# Patient Record
Sex: FEMALE | Race: WHITE | NOT HISPANIC OR LATINO | Employment: FULL TIME | URBAN - NONMETROPOLITAN AREA
[De-identification: names, ages, dates, MRNs, and addresses within clinical notes are randomized per-mention and may not be internally consistent; named-entity substitution may affect disease eponyms.]

---

## 2018-01-25 ENCOUNTER — OUTSIDE FACILITY SERVICE (OUTPATIENT)
Dept: CARDIOLOGY | Facility: CLINIC | Age: 43
End: 2018-01-25

## 2018-01-25 PROCEDURE — 93018 CV STRESS TEST I&R ONLY: CPT | Performed by: INTERNAL MEDICINE

## 2018-01-25 PROCEDURE — 78452 HT MUSCLE IMAGE SPECT MULT: CPT | Performed by: INTERNAL MEDICINE

## 2022-06-06 ENCOUNTER — OUTSIDE FACILITY SERVICE (OUTPATIENT)
Dept: CARDIOLOGY | Facility: CLINIC | Age: 47
End: 2022-06-06

## 2022-06-06 PROCEDURE — 93018 CV STRESS TEST I&R ONLY: CPT | Performed by: INTERNAL MEDICINE

## 2022-06-06 PROCEDURE — 78452 HT MUSCLE IMAGE SPECT MULT: CPT | Performed by: INTERNAL MEDICINE

## 2024-05-26 ENCOUNTER — HOSPITAL ENCOUNTER (OUTPATIENT)
Facility: HOSPITAL | Age: 49
Discharge: HOME | End: 2024-05-28
Attending: EMERGENCY MEDICINE | Admitting: INTERNAL MEDICINE
Payer: COMMERCIAL

## 2024-05-26 ENCOUNTER — APPOINTMENT (OUTPATIENT)
Dept: CARDIOLOGY | Facility: HOSPITAL | Age: 49
End: 2024-05-26
Payer: COMMERCIAL

## 2024-05-26 ENCOUNTER — APPOINTMENT (OUTPATIENT)
Dept: CT IMAGING | Facility: HOSPITAL | Age: 49
End: 2024-05-26
Payer: COMMERCIAL

## 2024-05-26 ENCOUNTER — APPOINTMENT (OUTPATIENT)
Dept: GENERAL RADIOLOGY | Facility: HOSPITAL | Age: 49
End: 2024-05-26
Payer: COMMERCIAL

## 2024-05-26 DIAGNOSIS — I20.0 UNSTABLE ANGINA: Primary | ICD-10-CM

## 2024-05-26 DIAGNOSIS — E87.6 HYPOKALEMIA: ICD-10-CM

## 2024-05-26 DIAGNOSIS — R07.2 PRECORDIAL PAIN: ICD-10-CM

## 2024-05-26 DIAGNOSIS — Z95.5 HISTORY OF PLACEMENT OF STENT IN LAD CORONARY ARTERY: ICD-10-CM

## 2024-05-26 PROBLEM — R07.9 CHEST PAIN: Status: ACTIVE | Noted: 2024-05-26

## 2024-05-26 LAB
ALBUMIN SERPL-MCNC: 4.7 G/DL (ref 3.5–5.2)
ALBUMIN/GLOB SERPL: 1.4 G/DL
ALP SERPL-CCNC: 101 U/L (ref 39–117)
ALT SERPL W P-5'-P-CCNC: 13 U/L (ref 1–33)
ANION GAP SERPL CALCULATED.3IONS-SCNC: 9.9 MMOL/L (ref 5–15)
AST SERPL-CCNC: 23 U/L (ref 1–32)
BASOPHILS # BLD AUTO: 0.05 10*3/MM3 (ref 0–0.2)
BASOPHILS NFR BLD AUTO: 0.7 % (ref 0–1.5)
BH CV ECHO MEAS - AO MAX PG: 6.7 MMHG
BH CV ECHO MEAS - AO MEAN PG: 3 MMHG
BH CV ECHO MEAS - AO ROOT DIAM: 2.9 CM
BH CV ECHO MEAS - AO V2 MAX: 129 CM/SEC
BH CV ECHO MEAS - AO V2 VTI: 27.6 CM
BH CV ECHO MEAS - AVA(I,D): 2.07 CM2
BH CV ECHO MEAS - EDV(CUBED): 101.2 ML
BH CV ECHO MEAS - EDV(MOD-SP2): 56.7 ML
BH CV ECHO MEAS - EDV(MOD-SP4): 66.4 ML
BH CV ECHO MEAS - EF(MOD-BP): 60.3 %
BH CV ECHO MEAS - EF(MOD-SP2): 60.8 %
BH CV ECHO MEAS - EF(MOD-SP4): 60.4 %
BH CV ECHO MEAS - ESV(CUBED): 24.6 ML
BH CV ECHO MEAS - ESV(MOD-SP2): 22.2 ML
BH CV ECHO MEAS - ESV(MOD-SP4): 26.3 ML
BH CV ECHO MEAS - FS: 37.6 %
BH CV ECHO MEAS - IVS/LVPW: 1.07 CM
BH CV ECHO MEAS - IVSD: 0.96 CM
BH CV ECHO MEAS - LA DIMENSION: 3.6 CM
BH CV ECHO MEAS - LAT PEAK E' VEL: 10.2 CM/SEC
BH CV ECHO MEAS - LV DIASTOLIC VOL/BSA (35-75): 38.8 CM2
BH CV ECHO MEAS - LV MASS(C)D: 147 GRAMS
BH CV ECHO MEAS - LV MAX PG: 3.8 MMHG
BH CV ECHO MEAS - LV MEAN PG: 2 MMHG
BH CV ECHO MEAS - LV SYSTOLIC VOL/BSA (12-30): 15.4 CM2
BH CV ECHO MEAS - LV V1 MAX: 97.3 CM/SEC
BH CV ECHO MEAS - LV V1 VTI: 22.5 CM
BH CV ECHO MEAS - LVIDD: 4.7 CM
BH CV ECHO MEAS - LVIDS: 2.9 CM
BH CV ECHO MEAS - LVOT AREA: 2.5 CM2
BH CV ECHO MEAS - LVOT DIAM: 1.8 CM
BH CV ECHO MEAS - LVPWD: 0.9 CM
BH CV ECHO MEAS - MED PEAK E' VEL: 10.3 CM/SEC
BH CV ECHO MEAS - MV A MAX VEL: 58.3 CM/SEC
BH CV ECHO MEAS - MV DEC SLOPE: 280 CM/SEC2
BH CV ECHO MEAS - MV DEC TIME: 0.28 SEC
BH CV ECHO MEAS - MV E MAX VEL: 79.3 CM/SEC
BH CV ECHO MEAS - MV E/A: 1.36
BH CV ECHO MEAS - PA ACC TIME: 0.18 SEC
BH CV ECHO MEAS - SV(LVOT): 57.3 ML
BH CV ECHO MEAS - SV(MOD-SP2): 34.5 ML
BH CV ECHO MEAS - SV(MOD-SP4): 40.1 ML
BH CV ECHO MEAS - SVI(LVOT): 33.5 ML/M2
BH CV ECHO MEAS - SVI(MOD-SP2): 20.2 ML/M2
BH CV ECHO MEAS - SVI(MOD-SP4): 23.4 ML/M2
BH CV ECHO MEAS - TAPSE (>1.6): 2.6 CM
BH CV ECHO MEAS - TR MAX PG: 25.6 MMHG
BH CV ECHO MEAS - TR MAX VEL: 253 CM/SEC
BH CV ECHO MEASUREMENTS AVERAGE E/E' RATIO: 7.74
BH CV XLRA - RV BASE: 3.2 CM
BH CV XLRA - RV LENGTH: 5.6 CM
BH CV XLRA - RV MID: 2.6 CM
BILIRUB SERPL-MCNC: 0.4 MG/DL (ref 0–1.2)
BUN SERPL-MCNC: 11 MG/DL (ref 6–20)
BUN/CREAT SERPL: 14.3 (ref 7–25)
CALCIUM SPEC-SCNC: 9.9 MG/DL (ref 8.6–10.5)
CHLORIDE SERPL-SCNC: 106 MMOL/L (ref 98–107)
CO2 SERPL-SCNC: 25.1 MMOL/L (ref 22–29)
CREAT SERPL-MCNC: 0.77 MG/DL (ref 0.57–1)
D DIMER PPP FEU-MCNC: <0.27 MCGFEU/ML (ref 0–0.5)
DEPRECATED RDW RBC AUTO: 37.7 FL (ref 37–54)
EGFRCR SERPLBLD CKD-EPI 2021: 95.9 ML/MIN/1.73
EOSINOPHIL # BLD AUTO: 0.17 10*3/MM3 (ref 0–0.4)
EOSINOPHIL NFR BLD AUTO: 2.5 % (ref 0.3–6.2)
ERYTHROCYTE [DISTWIDTH] IN BLOOD BY AUTOMATED COUNT: 12.1 % (ref 12.3–15.4)
GEN 5 2HR TROPONIN T REFLEX: 6 NG/L
GLOBULIN UR ELPH-MCNC: 3.4 GM/DL
GLUCOSE SERPL-MCNC: 121 MG/DL (ref 65–99)
HCT VFR BLD AUTO: 43.1 % (ref 34–46.6)
HGB BLD-MCNC: 14.6 G/DL (ref 12–15.9)
HOLD SPECIMEN: NORMAL
HOLD SPECIMEN: NORMAL
IMM GRANULOCYTES # BLD AUTO: 0.03 10*3/MM3 (ref 0–0.05)
IMM GRANULOCYTES NFR BLD AUTO: 0.4 % (ref 0–0.5)
LEFT ATRIUM VOLUME INDEX: 20.9 ML/M2
LYMPHOCYTES # BLD AUTO: 2.73 10*3/MM3 (ref 0.7–3.1)
LYMPHOCYTES NFR BLD AUTO: 39.6 % (ref 19.6–45.3)
MCH RBC QN AUTO: 29.1 PG (ref 26.6–33)
MCHC RBC AUTO-ENTMCNC: 33.9 G/DL (ref 31.5–35.7)
MCV RBC AUTO: 86 FL (ref 79–97)
MONOCYTES # BLD AUTO: 0.56 10*3/MM3 (ref 0.1–0.9)
MONOCYTES NFR BLD AUTO: 8.1 % (ref 5–12)
NEUTROPHILS NFR BLD AUTO: 3.35 10*3/MM3 (ref 1.7–7)
NEUTROPHILS NFR BLD AUTO: 48.7 % (ref 42.7–76)
NRBC BLD AUTO-RTO: 0 /100 WBC (ref 0–0.2)
NT-PROBNP SERPL-MCNC: 162 PG/ML (ref 0–450)
PLATELET # BLD AUTO: 213 10*3/MM3 (ref 140–450)
PMV BLD AUTO: 9.5 FL (ref 6–12)
POTASSIUM SERPL-SCNC: 3.4 MMOL/L (ref 3.5–5.2)
PROT SERPL-MCNC: 8.1 G/DL (ref 6–8.5)
QT INTERVAL: 404 MS
QT INTERVAL: 452 MS
QT INTERVAL: 464 MS
QTC INTERVAL: 451 MS
QTC INTERVAL: 452 MS
QTC INTERVAL: 464 MS
RBC # BLD AUTO: 5.01 10*6/MM3 (ref 3.77–5.28)
SODIUM SERPL-SCNC: 141 MMOL/L (ref 136–145)
TROPONIN T DELTA: -1 NG/L
TROPONIN T SERPL HS-MCNC: 7 NG/L
TROPONIN T SERPL HS-MCNC: <6 NG/L
TROPONIN T SERPL HS-MCNC: <6 NG/L
WBC NRBC COR # BLD AUTO: 6.89 10*3/MM3 (ref 3.4–10.8)
WHOLE BLOOD HOLD COAG: NORMAL
WHOLE BLOOD HOLD SPECIMEN: NORMAL

## 2024-05-26 PROCEDURE — 25010000002 HEPARIN (PORCINE) PER 1000 UNITS: Performed by: INTERNAL MEDICINE

## 2024-05-26 PROCEDURE — G0378 HOSPITAL OBSERVATION PER HR: HCPCS

## 2024-05-26 PROCEDURE — 84484 ASSAY OF TROPONIN QUANT: CPT | Performed by: STUDENT IN AN ORGANIZED HEALTH CARE EDUCATION/TRAINING PROGRAM

## 2024-05-26 PROCEDURE — 96374 THER/PROPH/DIAG INJ IV PUSH: CPT

## 2024-05-26 PROCEDURE — 85379 FIBRIN DEGRADATION QUANT: CPT | Performed by: STUDENT IN AN ORGANIZED HEALTH CARE EDUCATION/TRAINING PROGRAM

## 2024-05-26 PROCEDURE — 96372 THER/PROPH/DIAG INJ SC/IM: CPT

## 2024-05-26 PROCEDURE — 93306 TTE W/DOPPLER COMPLETE: CPT | Performed by: INTERNAL MEDICINE

## 2024-05-26 PROCEDURE — 93010 ELECTROCARDIOGRAM REPORT: CPT | Performed by: INTERNAL MEDICINE

## 2024-05-26 PROCEDURE — 71045 X-RAY EXAM CHEST 1 VIEW: CPT | Performed by: RADIOLOGY

## 2024-05-26 PROCEDURE — 85025 COMPLETE CBC W/AUTO DIFF WBC: CPT | Performed by: EMERGENCY MEDICINE

## 2024-05-26 PROCEDURE — 93005 ELECTROCARDIOGRAM TRACING: CPT

## 2024-05-26 PROCEDURE — 71045 X-RAY EXAM CHEST 1 VIEW: CPT

## 2024-05-26 PROCEDURE — 96375 TX/PRO/DX INJ NEW DRUG ADDON: CPT

## 2024-05-26 PROCEDURE — 84484 ASSAY OF TROPONIN QUANT: CPT

## 2024-05-26 PROCEDURE — 80053 COMPREHEN METABOLIC PANEL: CPT | Performed by: EMERGENCY MEDICINE

## 2024-05-26 PROCEDURE — 75574 CT ANGIO HRT W/3D IMAGE: CPT

## 2024-05-26 PROCEDURE — 93306 TTE W/DOPPLER COMPLETE: CPT

## 2024-05-26 PROCEDURE — 25510000001 IOPAMIDOL PER 1 ML: Performed by: STUDENT IN AN ORGANIZED HEALTH CARE EDUCATION/TRAINING PROGRAM

## 2024-05-26 PROCEDURE — 93005 ELECTROCARDIOGRAM TRACING: CPT | Performed by: STUDENT IN AN ORGANIZED HEALTH CARE EDUCATION/TRAINING PROGRAM

## 2024-05-26 PROCEDURE — 25010000002 ONDANSETRON PER 1 MG: Performed by: EMERGENCY MEDICINE

## 2024-05-26 PROCEDURE — 83880 ASSAY OF NATRIURETIC PEPTIDE: CPT | Performed by: EMERGENCY MEDICINE

## 2024-05-26 PROCEDURE — C1751 CATH, INF, PER/CENT/MIDLINE: HCPCS

## 2024-05-26 PROCEDURE — 36415 COLL VENOUS BLD VENIPUNCTURE: CPT

## 2024-05-26 PROCEDURE — 99285 EMERGENCY DEPT VISIT HI MDM: CPT

## 2024-05-26 PROCEDURE — 25010000002 MORPHINE PER 10 MG: Performed by: EMERGENCY MEDICINE

## 2024-05-26 RX ORDER — ATORVASTATIN CALCIUM 40 MG/1
40 TABLET, FILM COATED ORAL DAILY
Status: DISCONTINUED | OUTPATIENT
Start: 2024-05-26 | End: 2024-05-28 | Stop reason: HOSPADM

## 2024-05-26 RX ORDER — PRASUGREL 10 MG/1
10 TABLET, FILM COATED ORAL ONCE
Status: COMPLETED | OUTPATIENT
Start: 2024-05-26 | End: 2024-05-26

## 2024-05-26 RX ORDER — RANOLAZINE 1000 MG/1
1000 TABLET, EXTENDED RELEASE ORAL DAILY
COMMUNITY

## 2024-05-26 RX ORDER — SODIUM CHLORIDE 0.9 % (FLUSH) 0.9 %
10 SYRINGE (ML) INJECTION AS NEEDED
Status: DISCONTINUED | OUTPATIENT
Start: 2024-05-26 | End: 2024-05-28 | Stop reason: HOSPADM

## 2024-05-26 RX ORDER — ALBUTEROL SULFATE 2.5 MG/3ML
2.5 SOLUTION RESPIRATORY (INHALATION) EVERY 6 HOURS PRN
Status: DISCONTINUED | OUTPATIENT
Start: 2024-05-26 | End: 2024-05-28 | Stop reason: HOSPADM

## 2024-05-26 RX ORDER — ATORVASTATIN CALCIUM 40 MG/1
40 TABLET, FILM COATED ORAL DAILY
COMMUNITY

## 2024-05-26 RX ORDER — AMOXICILLIN 250 MG
2 CAPSULE ORAL 2 TIMES DAILY PRN
Status: DISCONTINUED | OUTPATIENT
Start: 2024-05-26 | End: 2024-05-28 | Stop reason: HOSPADM

## 2024-05-26 RX ORDER — FLUTICASONE PROPIONATE 50 MCG
2 SPRAY, SUSPENSION (ML) NASAL DAILY PRN
COMMUNITY

## 2024-05-26 RX ORDER — AMOXICILLIN AND CLAVULANATE POTASSIUM 875; 125 MG/1; MG/1
1 TABLET, FILM COATED ORAL 2 TIMES DAILY
COMMUNITY
Start: 2024-05-23 | End: 2024-05-28 | Stop reason: HOSPADM

## 2024-05-26 RX ORDER — ASPIRIN 81 MG/1
324 TABLET, CHEWABLE ORAL ONCE
Status: COMPLETED | OUTPATIENT
Start: 2024-05-26 | End: 2024-05-26

## 2024-05-26 RX ORDER — ASPIRIN 81 MG/1
81 TABLET ORAL DAILY
COMMUNITY

## 2024-05-26 RX ORDER — HEPARIN SODIUM 5000 [USP'U]/ML
5000 INJECTION, SOLUTION INTRAVENOUS; SUBCUTANEOUS EVERY 8 HOURS SCHEDULED
Status: DISCONTINUED | OUTPATIENT
Start: 2024-05-26 | End: 2024-05-28 | Stop reason: HOSPADM

## 2024-05-26 RX ORDER — RANOLAZINE 500 MG/1
1000 TABLET, EXTENDED RELEASE ORAL DAILY
Status: DISCONTINUED | OUTPATIENT
Start: 2024-05-26 | End: 2024-05-28 | Stop reason: HOSPADM

## 2024-05-26 RX ORDER — CALCIUM CARBONATE 500 MG/1
1 TABLET, CHEWABLE ORAL 3 TIMES DAILY PRN
Status: DISCONTINUED | OUTPATIENT
Start: 2024-05-26 | End: 2024-05-28 | Stop reason: HOSPADM

## 2024-05-26 RX ORDER — SODIUM CHLORIDE 0.9 % (FLUSH) 0.9 %
10 SYRINGE (ML) INJECTION EVERY 12 HOURS SCHEDULED
Status: DISCONTINUED | OUTPATIENT
Start: 2024-05-26 | End: 2024-05-28 | Stop reason: HOSPADM

## 2024-05-26 RX ORDER — PRASUGREL 10 MG/1
10 TABLET, FILM COATED ORAL DAILY
COMMUNITY

## 2024-05-26 RX ORDER — POTASSIUM CHLORIDE 20 MEQ/1
20 TABLET, EXTENDED RELEASE ORAL ONCE
Status: COMPLETED | OUTPATIENT
Start: 2024-05-26 | End: 2024-05-26

## 2024-05-26 RX ORDER — AMOXICILLIN AND CLAVULANATE POTASSIUM 875; 125 MG/1; MG/1
1 TABLET, FILM COATED ORAL 2 TIMES DAILY
Status: CANCELLED | OUTPATIENT
Start: 2024-05-26 | End: 2024-06-02

## 2024-05-26 RX ORDER — PANTOPRAZOLE SODIUM 40 MG/1
40 TABLET, DELAYED RELEASE ORAL
Status: DISCONTINUED | OUTPATIENT
Start: 2024-05-26 | End: 2024-05-28 | Stop reason: HOSPADM

## 2024-05-26 RX ORDER — SODIUM CHLORIDE 9 MG/ML
40 INJECTION, SOLUTION INTRAVENOUS AS NEEDED
Status: DISCONTINUED | OUTPATIENT
Start: 2024-05-26 | End: 2024-05-28 | Stop reason: HOSPADM

## 2024-05-26 RX ORDER — PRASUGREL 10 MG/1
10 TABLET, FILM COATED ORAL DAILY
Status: DISCONTINUED | OUTPATIENT
Start: 2024-05-27 | End: 2024-05-28 | Stop reason: HOSPADM

## 2024-05-26 RX ORDER — ASPIRIN 81 MG/1
81 TABLET ORAL DAILY
Status: DISCONTINUED | OUTPATIENT
Start: 2024-05-27 | End: 2024-05-28 | Stop reason: HOSPADM

## 2024-05-26 RX ORDER — FERROUS SULFATE 325(65) MG
325 TABLET ORAL
COMMUNITY

## 2024-05-26 RX ORDER — POLYETHYLENE GLYCOL 3350 17 G/17G
17 POWDER, FOR SOLUTION ORAL DAILY PRN
Status: DISCONTINUED | OUTPATIENT
Start: 2024-05-26 | End: 2024-05-28 | Stop reason: HOSPADM

## 2024-05-26 RX ORDER — ONDANSETRON 2 MG/ML
4 INJECTION INTRAMUSCULAR; INTRAVENOUS ONCE
Status: COMPLETED | OUTPATIENT
Start: 2024-05-26 | End: 2024-05-26

## 2024-05-26 RX ORDER — BISACODYL 5 MG/1
5 TABLET, DELAYED RELEASE ORAL DAILY PRN
Status: DISCONTINUED | OUTPATIENT
Start: 2024-05-26 | End: 2024-05-28 | Stop reason: HOSPADM

## 2024-05-26 RX ORDER — ALBUTEROL SULFATE 90 UG/1
2 AEROSOL, METERED RESPIRATORY (INHALATION) EVERY 6 HOURS PRN
COMMUNITY

## 2024-05-26 RX ORDER — BISACODYL 10 MG
10 SUPPOSITORY, RECTAL RECTAL DAILY PRN
Status: DISCONTINUED | OUTPATIENT
Start: 2024-05-26 | End: 2024-05-28 | Stop reason: HOSPADM

## 2024-05-26 RX ORDER — FLUTICASONE PROPIONATE 50 MCG
2 SPRAY, SUSPENSION (ML) NASAL DAILY PRN
Status: DISCONTINUED | OUTPATIENT
Start: 2024-05-26 | End: 2024-05-28 | Stop reason: HOSPADM

## 2024-05-26 RX ORDER — OMEPRAZOLE 20 MG/1
20 CAPSULE, DELAYED RELEASE ORAL DAILY
COMMUNITY
End: 2024-05-28 | Stop reason: HOSPADM

## 2024-05-26 RX ORDER — PANTOPRAZOLE SODIUM 40 MG/1
40 TABLET, DELAYED RELEASE ORAL
Status: CANCELLED | OUTPATIENT
Start: 2024-05-27

## 2024-05-26 RX ORDER — FERROUS SULFATE 325(65) MG
325 TABLET ORAL
Status: DISCONTINUED | OUTPATIENT
Start: 2024-05-27 | End: 2024-05-28 | Stop reason: HOSPADM

## 2024-05-26 RX ADMIN — HEPARIN SODIUM 5000 UNITS: 5000 INJECTION INTRAVENOUS; SUBCUTANEOUS at 20:57

## 2024-05-26 RX ADMIN — PRASUGREL HYDROCHLORIDE 10 MG: 10 TABLET, FILM COATED ORAL at 11:43

## 2024-05-26 RX ADMIN — Medication 10 ML: at 20:56

## 2024-05-26 RX ADMIN — ASPIRIN 324 MG: 81 TABLET, CHEWABLE ORAL at 05:46

## 2024-05-26 RX ADMIN — RANOLAZINE 1000 MG: 500 TABLET, FILM COATED, EXTENDED RELEASE ORAL at 20:56

## 2024-05-26 RX ADMIN — PANTOPRAZOLE SODIUM 40 MG: 40 TABLET, DELAYED RELEASE ORAL at 18:10

## 2024-05-26 RX ADMIN — NITROGLYCERIN 0.5 INCH: 20 OINTMENT TOPICAL at 06:02

## 2024-05-26 RX ADMIN — ATORVASTATIN CALCIUM 40 MG: 40 TABLET, FILM COATED ORAL at 20:56

## 2024-05-26 RX ADMIN — IOPAMIDOL 96 ML: 755 INJECTION, SOLUTION INTRAVENOUS at 11:35

## 2024-05-26 RX ADMIN — POTASSIUM CHLORIDE 20 MEQ: 1500 TABLET, EXTENDED RELEASE ORAL at 06:57

## 2024-05-26 RX ADMIN — ONDANSETRON 4 MG: 2 INJECTION INTRAMUSCULAR; INTRAVENOUS at 05:47

## 2024-05-26 RX ADMIN — MORPHINE SULFATE 4 MG: 4 INJECTION, SOLUTION INTRAMUSCULAR; INTRAVENOUS at 05:48

## 2024-05-26 NOTE — ED PROVIDER NOTES
"Subjective   History of Present Illness  Patient is 48-year-old female who presents with a chief complaint of chest pain that awoke her from sleep approximately 45 minutes prior to arrival.  This is substernal pain described as \"pulsating\", nonradiating.  She has felt somewhat lightheaded with it.  No associated shortness of breath, nausea, vomiting, diaphoresis.  She goes on to state that over the last few weeks she has been having some mild shortness of breath and some mild swelling in her ankles.  She denies any other symptoms.  She has not had her morning medications yet today.  Her anticoagulation regimen consists of aspirin 81 mg and Effient 10 mg daily.  She does report a history of coronary artery disease, had a coronary artery stent approximately 7 years ago, she thinks it may have been in her LAD, she is not sure.  She also reports a history of hypertension and dyslipidemia.  Denies any history of diabetes.  She reports that she is not a smoker.      Review of Systems   All other systems reviewed and are negative.      History reviewed. No pertinent past medical history.    Allergies   Allergen Reactions    Sulfa Antibiotics Other (See Comments)     Lethargic        History reviewed. No pertinent surgical history.    History reviewed. No pertinent family history.    Social History     Socioeconomic History    Marital status:    Tobacco Use    Smoking status: Never    Smokeless tobacco: Never   Vaping Use    Vaping status: Every Day    Substances: Nicotine, Flavoring    Devices: Disposable   Substance and Sexual Activity    Alcohol use: Never    Drug use: Never    Sexual activity: Yes           Objective   Physical Exam  Vitals and nursing note reviewed.   Constitutional:       General: She is not in acute distress.     Appearance: Normal appearance. She is well-developed. She is not ill-appearing, toxic-appearing or diaphoretic.      Comments: Well-developed well-nourished female, alert and " well-oriented, in no apparent acute distress.   HENT:      Head: Normocephalic and atraumatic.   Eyes:      General: No scleral icterus.     Pupils: Pupils are equal, round, and reactive to light.   Neck:      Trachea: No tracheal deviation.   Cardiovascular:      Rate and Rhythm: Normal rate and regular rhythm.   Pulmonary:      Effort: Pulmonary effort is normal. No respiratory distress.      Breath sounds: Normal breath sounds.   Chest:      Chest wall: No tenderness.   Abdominal:      General: Bowel sounds are normal.      Palpations: Abdomen is soft.      Tenderness: There is no abdominal tenderness. There is no guarding or rebound.   Musculoskeletal:         General: No tenderness. Normal range of motion.      Cervical back: Normal range of motion and neck supple. No rigidity or tenderness.      Right lower leg: No tenderness. No edema.      Left lower leg: No tenderness. No edema.   Skin:     General: Skin is warm and dry.      Capillary Refill: Capillary refill takes less than 2 seconds.      Coloration: Skin is not pale.   Neurological:      General: No focal deficit present.      Mental Status: She is alert and oriented to person, place, and time.      GCS: GCS eye subscore is 4. GCS verbal subscore is 5. GCS motor subscore is 6.      Motor: No abnormal muscle tone.   Psychiatric:         Mood and Affect: Mood normal.         Behavior: Behavior normal.         Procedures  EKG at 0529 shows sinus rhythm, rate 75.  WY interval 146, QRS duration 82, QTc 451 ms.  T wave inversion in an anterolateral distribution.  No evidence for STEMI.  No previous tracings on file for comparison.     Results for orders placed or performed during the hospital encounter of 05/26/24   High Sensitivity Troponin T    Specimen: Arm, Left; Blood   Result Value Ref Range    HS Troponin T 7 <14 ng/L   Comprehensive Metabolic Panel    Specimen: Arm, Left; Blood   Result Value Ref Range    Glucose 121 (H) 65 - 99 mg/dL    BUN 11 6 - 20  mg/dL    Creatinine 0.77 0.57 - 1.00 mg/dL    Sodium 141 136 - 145 mmol/L    Potassium 3.4 (L) 3.5 - 5.2 mmol/L    Chloride 106 98 - 107 mmol/L    CO2 25.1 22.0 - 29.0 mmol/L    Calcium 9.9 8.6 - 10.5 mg/dL    Total Protein 8.1 6.0 - 8.5 g/dL    Albumin 4.7 3.5 - 5.2 g/dL    ALT (SGPT) 13 1 - 33 U/L    AST (SGOT) 23 1 - 32 U/L    Alkaline Phosphatase 101 39 - 117 U/L    Total Bilirubin 0.4 0.0 - 1.2 mg/dL    Globulin 3.4 gm/dL    A/G Ratio 1.4 g/dL    BUN/Creatinine Ratio 14.3 7.0 - 25.0    Anion Gap 9.9 5.0 - 15.0 mmol/L    eGFR 95.9 >60.0 mL/min/1.73   CBC Auto Differential    Specimen: Arm, Left; Blood   Result Value Ref Range    WBC 6.89 3.40 - 10.80 10*3/mm3    RBC 5.01 3.77 - 5.28 10*6/mm3    Hemoglobin 14.6 12.0 - 15.9 g/dL    Hematocrit 43.1 34.0 - 46.6 %    MCV 86.0 79.0 - 97.0 fL    MCH 29.1 26.6 - 33.0 pg    MCHC 33.9 31.5 - 35.7 g/dL    RDW 12.1 (L) 12.3 - 15.4 %    RDW-SD 37.7 37.0 - 54.0 fl    MPV 9.5 6.0 - 12.0 fL    Platelets 213 140 - 450 10*3/mm3    Neutrophil % 48.7 42.7 - 76.0 %    Lymphocyte % 39.6 19.6 - 45.3 %    Monocyte % 8.1 5.0 - 12.0 %    Eosinophil % 2.5 0.3 - 6.2 %    Basophil % 0.7 0.0 - 1.5 %    Immature Grans % 0.4 0.0 - 0.5 %    Neutrophils, Absolute 3.35 1.70 - 7.00 10*3/mm3    Lymphocytes, Absolute 2.73 0.70 - 3.10 10*3/mm3    Monocytes, Absolute 0.56 0.10 - 0.90 10*3/mm3    Eosinophils, Absolute 0.17 0.00 - 0.40 10*3/mm3    Basophils, Absolute 0.05 0.00 - 0.20 10*3/mm3    Immature Grans, Absolute 0.03 0.00 - 0.05 10*3/mm3    nRBC 0.0 0.0 - 0.2 /100 WBC   BNP    Specimen: Arm, Left; Blood   Result Value Ref Range    proBNP 162.0 0.0 - 450.0 pg/mL   High Sensitivity Troponin T 2Hr    Specimen: Arm, Right; Blood   Result Value Ref Range    HS Troponin T 6 <14 ng/L    Troponin T Delta -1 >=-4 - <+4 ng/L   Single High Sensitivity Troponin T    Specimen: Arm, Right; Blood   Result Value Ref Range    HS Troponin T <6 <14 ng/L   D-dimer, Quantitative    Specimen: Arm, Left; Blood    Result Value Ref Range    D-Dimer, Quantitative <0.27 0.00 - 0.50 MCGFEU/mL   Single High Sensitivity Troponin T    Specimen: Arm, Right; Blood   Result Value Ref Range    HS Troponin T <6 <14 ng/L   Comprehensive Metabolic Panel    Specimen: Blood   Result Value Ref Range    Glucose 106 (H) 65 - 99 mg/dL    BUN 12 6 - 20 mg/dL    Creatinine 0.69 0.57 - 1.00 mg/dL    Sodium 139 136 - 145 mmol/L    Potassium 4.0 3.5 - 5.2 mmol/L    Chloride 106 98 - 107 mmol/L    CO2 22.7 22.0 - 29.0 mmol/L    Calcium 9.0 8.6 - 10.5 mg/dL    Total Protein 6.6 6.0 - 8.5 g/dL    Albumin 4.0 3.5 - 5.2 g/dL    ALT (SGPT) 11 1 - 33 U/L    AST (SGOT) 22 1 - 32 U/L    Alkaline Phosphatase 86 39 - 117 U/L    Total Bilirubin 0.4 0.0 - 1.2 mg/dL    Globulin 2.6 gm/dL    A/G Ratio 1.5 g/dL    BUN/Creatinine Ratio 17.4 7.0 - 25.0    Anion Gap 10.3 5.0 - 15.0 mmol/L    eGFR 107.9 >60.0 mL/min/1.73   CBC (No Diff)    Specimen: Blood   Result Value Ref Range    WBC 6.51 3.40 - 10.80 10*3/mm3    RBC 4.22 3.77 - 5.28 10*6/mm3    Hemoglobin 12.3 12.0 - 15.9 g/dL    Hematocrit 37.5 34.0 - 46.6 %    MCV 88.9 79.0 - 97.0 fL    MCH 29.1 26.6 - 33.0 pg    MCHC 32.8 31.5 - 35.7 g/dL    RDW 12.2 (L) 12.3 - 15.4 %    RDW-SD 39.8 37.0 - 54.0 fl    MPV 9.8 6.0 - 12.0 fL    Platelets 176 140 - 450 10*3/mm3   Comprehensive Metabolic Panel    Specimen: Blood   Result Value Ref Range    Glucose 110 (H) 65 - 99 mg/dL    BUN 12 6 - 20 mg/dL    Creatinine 0.72 0.57 - 1.00 mg/dL    Sodium 137 136 - 145 mmol/L    Potassium 4.0 3.5 - 5.2 mmol/L    Chloride 105 98 - 107 mmol/L    CO2 22.5 22.0 - 29.0 mmol/L    Calcium 9.3 8.6 - 10.5 mg/dL    Total Protein 7.0 6.0 - 8.5 g/dL    Albumin 4.0 3.5 - 5.2 g/dL    ALT (SGPT) 11 1 - 33 U/L    AST (SGOT) 20 1 - 32 U/L    Alkaline Phosphatase 87 39 - 117 U/L    Total Bilirubin 0.3 0.0 - 1.2 mg/dL    Globulin 3.0 gm/dL    A/G Ratio 1.3 g/dL    BUN/Creatinine Ratio 16.7 7.0 - 25.0    Anion Gap 9.5 5.0 - 15.0 mmol/L    eGFR 103.9  >60.0 mL/min/1.73   CBC (No Diff)    Specimen: Blood   Result Value Ref Range    WBC 6.15 3.40 - 10.80 10*3/mm3    RBC 4.51 3.77 - 5.28 10*6/mm3    Hemoglobin 13.4 12.0 - 15.9 g/dL    Hematocrit 39.1 34.0 - 46.6 %    MCV 86.7 79.0 - 97.0 fL    MCH 29.7 26.6 - 33.0 pg    MCHC 34.3 31.5 - 35.7 g/dL    RDW 11.9 (L) 12.3 - 15.4 %    RDW-SD 38.1 37.0 - 54.0 fl    MPV 10.0 6.0 - 12.0 fL    Platelets 186 140 - 450 10*3/mm3   ECG 12 Lead Chest Pain   Result Value Ref Range    QT Interval 404 ms    QTC Interval 451 ms   ECG 12 Lead Chest Pain   Result Value Ref Range    QT Interval 464 ms    QTC Interval 464 ms   ECG 12 Lead Rhythm Change   Result Value Ref Range    QT Interval 452 ms    QTC Interval 452 ms   Adult Transthoracic Echo Complete W/ Cont if Necessary Per Protocol   Result Value Ref Range    EF(MOD-bp) 60.3 %    LVIDd 4.7 cm    LVIDs 2.9 cm    IVSd 0.96 cm    LVPWd 0.90 cm    FS 37.6 %    IVS/LVPW 1.07 cm    ESV(cubed) 24.6 ml    LV Sys Vol (BSA corrected) 15.4 cm2    EDV(cubed) 101.2 ml    LV [place] Vol (BSA corrected) 38.8 cm2    LV mass(C)d 147.0 grams    LVOT area 2.5 cm2    LVOT diam 1.80 cm    EDV(MOD-sp2) 56.7 ml    EDV(MOD-sp4) 66.4 ml    ESV(MOD-sp2) 22.2 ml    ESV(MOD-sp4) 26.3 ml    SV(MOD-sp2) 34.5 ml    SV(MOD-sp4) 40.1 ml    SVi(MOD-SP2) 20.2 ml/m2    SVi(MOD-SP4) 23.4 ml/m2    SVi (LVOT) 33.5 ml/m2    EF(MOD-sp2) 60.8 %    EF(MOD-sp4) 60.4 %    MV E max luigi 79.3 cm/sec    MV A max luigi 58.3 cm/sec    MV dec time 0.28 sec    MV E/A 1.36     LA ESV Index (BP) 20.9 ml/m2    Med Peak E' Luigi 10.3 cm/sec    Lat Peak E' Luigi 10.2 cm/sec    TR max luigi 253.0 cm/sec    Avg E/e' ratio 7.74     SV(LVOT) 57.3 ml    RV Base 3.2 cm    RV Mid 2.6 cm    RV Length 5.6 cm    TAPSE (>1.6) 2.6 cm    LA dimension (2D)  3.6 cm    LV V1 max 97.3 cm/sec    LV V1 max PG 3.8 mmHg    LV V1 mean PG 2.00 mmHg    LV V1 VTI 22.5 cm    Ao pk luigi 129.0 cm/sec    Ao max PG 6.7 mmHg    Ao mean PG 3.0 mmHg    Ao V2 VTI 27.6 cm    ANGIE(I,D)  2.07 cm2    MV dec slope 280.0 cm/sec2    TR max PG 25.6 mmHg    PA acc time 0.18 sec    Ao root diam 2.9 cm   Green Top (Gel)   Result Value Ref Range    Extra Tube Hold for add-ons.    Lavender Top   Result Value Ref Range    Extra Tube hold for add-on    Gold Top - SST   Result Value Ref Range    Extra Tube Hold for add-ons.    Light Blue Top   Result Value Ref Range    Extra Tube Hold for add-ons.      Wells' Criteria for Pulmonary Embolism - MDCalc  Calculated on May 26 2024 10:47 AM  0.0 points -> Low risk group: 1.3% chance of PE in an ED population. Another study assigned scores ? 4 as “PE Unlikely” and had a 3% incidence of PE.    -- DDIMER added to workup  Electronically signed by Anette Rios DO, 05/26/24, 10:48 AM EDT.'    ED Course  ED Course as of 05/28/24 1906   Sun May 26, 2024   0701 Case discussed with and care endorsed to Dr. Rios at shift change.  Electronically signed by Vishal Ding MD, 05/26/24, 7:01 AM EDT.   [CM]   0812 Review of medical records show that patient in June 2022 followed with outpatient Dr. Gordon and Dr. Kern cardiology in [facility].  Stress test showed no acutely induced ischemic wall motion abnormalities.  EF was estimated at 59% at that time.    -Repeat troponin decreased by 1.  Patient has what appears to be old inverted T waves in the lateral precordial leads V4, V5 and V6 with no acute reciprocal changes.    -Repeat EKG was obtained prior to discharging the patient. [LK]   0859 Repeat EKG prior to discharge now shows T wave inversions in lead II that were not previously they are on initial EKG performed at 05 30 this morning.   [LK]   0286 Spoke with Dr. Mcmullen this time he recommended holding aspirin as tropes are negative.  He has been made aware of new inverted T waves in limb lead to now present that was not initially present around 0530 this morning.     Cardiology recommended proceeding with CTA coronary and patient had persistent and ongoing  chest pain admit to the hospital.   -Third troponin has been ordered.  Electronically signed by Anette Rios DO, 05/26/24, 9:47 AM EDT.   [LK]   0944 CT angio coronary can be done on the weekend but will not be officially read until Tuesday after the holiday.  -No availability for stress test to be performed today but can be performed tomorrow on Monday 5/27.    -Received 324 of aspirin on arrival. [LK]   1033 Dr. Mcmullen recommended proceeding with CTA coronary and due to ongoing chest pain and recommended admitting to the hospital for further observation and trending of cardiac enzymes.  Electronically signed by Anette Rios DO, 05/26/24, 10:33 AM EDT.    -Patient reports that she actually currently follows with cardiology at [facility] Dr. Hart.  Reports that she had actually called the office to make a follow-up appointment for next week because she has noticed that she has been more winded over the past 1 to 2 weeks prior to developing chest pain that woke her up from sleep last night.  Electronically signed by Anette Rios DO, 05/26/24, 10:35 AM EDT.     [LK]   1405 Patient started complain of recurrent chest pain once Nitropaste was removed after CT angio.  Stat EKG was obtained although electro cardiac conduction appears grossly unchanged from EKG from 08 56 EKG officially reads concern for inferior ischemia as well as anterior lateral.  Old septal infarct of undetermined age no acute reciprocal changes that have been identified for the duration of patient's admission  Electronically signed by Anette Rios DO, 05/26/24, 2:06 PM EDT.     [LK]      ED Course User Index  [CM] Vishal Ding MD  [LK] Anette Rios DO                                             Medical Decision Making  Problems Addressed:  History of placement of stent in LAD coronary artery: complicated acute illness or injury  Hypokalemia: complicated acute illness or injury  Unstable angina: complicated acute illness or  injury    Amount and/or Complexity of Data Reviewed  Labs: ordered.  Radiology: ordered.  ECG/medicine tests: ordered.    Risk  OTC drugs.  Prescription drug management.  Decision regarding hospitalization.        Final diagnoses:   Unstable angina   History of placement of stent in LAD coronary artery   Hypokalemia       ED Disposition  ED Disposition       ED Disposition   Decision to Admit    Condition   --    Comment   Level of Care: Telemetry [5]   Diagnosis: Chest pain [333871]   Admitting Physician: JOSE GALEANA [[id number]]   Attending Physician: JOSE GALEANA [[id number]]                 Nino Talbot MD  [address] [place] [address]  [address]  [place] [id number]  [phone]    Follow up in 1 week(s)  Will call pt with kaz due to office closed    Surinder Kern MD  [address] [facility] [address]  [place] [id number]  [phone]    Follow up in 1 month(s)           Medication List        New Prescriptions      pantoprazole 40 MG EC tablet  Commonly known as: PROTONIX  Take 1 tablet by mouth Every Morning for 30 days.  Start taking on: May 29, 2024            Stop      amoxicillin-clavulanate 875-125 MG per tablet  Commonly known as: AUGMENTIN     omeprazole 20 MG capsule  Commonly known as: priLOSEC               Where to Get Your Medications        These medications were sent to [facility] [address] [address]      Hours: Monday to Friday 7 AM to 6 PM Phone: [phone]   pantoprazole 40 MG EC tablet            Anette Rios,   05/26/24 [id number]       Anette Rios,   05/28/24 [id number]

## 2024-05-26 NOTE — Clinical Note
Hemostasis started on the right radial artery. R-Band was used in achieving hemostasis. Radial compression device applied to vessel. Hemostasis achieved successfully. Closure device additional comment: 15 ml of air

## 2024-05-26 NOTE — ED NOTES
Spoke with Dr. Mcmullen, he states to not give nitro prior to CT angio of heart due to nitro paste. Ye rad tech, aware.

## 2024-05-26 NOTE — ED NOTES
Zhao arndt called with patient in [place] stating that they do not know if they can give the nitro with nitro paste on patient and that there is nothing in their protocol regarding this.

## 2024-05-26 NOTE — H&P
[facility] HOSPITALIST HISTORY AND PHYSICAL    Patient Identification:  Name:  Abi Jesus  Age:  48 y.o.  Sex:  female  :  1975  MRN:  [id number]   Admit Date: 2024   Visit Number:  [id number]  Room number:  116/16  Primary Care Physician:  Nino Talbot MD     Subjective     Chief complaint:    Chief Complaint   Patient presents with    Chest Pain     History of presenting illness:   48F PMH Dyslipidemia, Coronary Artery Disease status post PCI, presented to  emergency room  with complaints of acute onset, non radiating, chest pain since this AM.  Upon arrival patient afebrile, heart rate and blood pressure normal, satting well on room air. Labs showed unremarkable basic chemistries, HS troponins negative x 2.  EKG without ischemic changes, though mild ischemia on repeat EKG lateral leads.  CXR without acute cardiopulmonary processes. Emergency room provider gave Aspirin 324, nitro, morphine, zofran, and home effient, discussed case with Dr. Mcmullen who recommended Coronary CTA which is pending.  Hospital Medicine consulted for admission. Patient seen and examined in emergency room, Cardiology present in the room and discussed CTA and possible LHC vs medical management, patient endorses mild ongoing pain, discussed checking D-dimer with emergency room provider. Patient reports history PCI to LAD, follows Dr. Hart, reports this pain is different than previous pain.   ---------------------------------------------------------------------------------------------------------------------   Review of Systems   Constitutional: Negative.    HENT: Negative.     Eyes: Negative.    Respiratory:  Positive for chest tightness.    Cardiovascular:  Positive for chest pain.   Gastrointestinal: Negative.    Endocrine: Negative.    Genitourinary: Negative.    Musculoskeletal: Negative.    Skin: Negative.    Allergic/Immunologic: Negative.    Neurological: Negative.     Hematological: Negative.    Psychiatric/Behavioral: Negative.       ---------------------------------------------------------------------------------------------------------------------   PMH: Dyslipidemia, Coronary Artery Disease  PSH: PCI to LAD   FH: Reviewed and non-contributory.   Social History     Socioeconomic History    Marital status:      ---------------------------------------------------------------------------------------------------------------------   Allergies:  Sulfa antibiotics  ---------------------------------------------------------------------------------------------------------------------   Medications below are reported home medications pulling from within the system; at this time, these medications have not been reconciled unless otherwise specified and are in the verification process for further verifcation as current home medications.    Prior to Admission Medications       None          Objective     Vital Signs:  Temp:  [97.9 °F (36.6 °C)] 97.9 °F (36.6 °C)  Heart Rate:  [55-76] 61  Resp:  [18] 18  BP: ()/(63-91) 104/72    Mean Arterial Pressure (Non-Invasive) for the past 24 hrs (Last 3 readings):   Noninvasive MAP (mmHg)   05/26/24 1100 89   05/26/24 1045 82   05/26/24 1030 81     SpO2:  [91 %-98 %] 93 %  on   ;   Device (Oxygen Therapy): room air  Body mass index is 26.57 kg/m².    Wt Readings from Last 3 Encounters:   05/26/24 68 kg (150 lb)   07/29/15 69.4 kg (153 lb)      ---------------------------------------------------------------------------------------------------------------------   Physical Exam:  Constitutional:  Well-developed and well-nourished.  No acute distress. Mild discomfort.    HENT:  Head:  Normocephalic and atraumatic.  Mouth:  Moist mucous membranes.    Eyes:  Conjunctivae and EOM are normal. No scleral icterus.    Neck:  Neck supple.  No JVD present.    Cardiovascular:  Normal rate, regular rhythm and normal heart sounds with no  murmur.  Pulmonary/Chest:  No respiratory distress, no wheezes, no crackles, with normal breath sounds and good air movement.  Abdominal:  Soft. No distension and no tenderness.   Musculoskeletal:  No tenderness, and no deformity.  No red or swollen joints anywhere.    Neurological:  Alert and oriented to person, place, and time.  No cranial nerve deficit.    Skin:  Skin is warm and dry. No rash noted. No pallor.   Peripheral vascular:  No clubbing, no cyanosis, no edema.  Psychiatric: Appropriate mood and affect  Edited by: Dio Malhotra MD at 5/26/2024 1049  ---------------------------------------------------------------------------------------------------------------------  EKG:    ECG 12 Lead Chest Pain   Preliminary Result   Test Reason : Chest Pain   Blood Pressure :   */*   mmHG   Vent. Rate :  60 BPM     Atrial Rate :  60 BPM      P-R Int : 158 ms          QRS Dur :  90 ms       QT Int : 464 ms       P-R-T Axes :  51  50 139 degrees      QTc Int : 464 ms      Normal sinus rhythm with sinus arrhythmia   T wave abnormality, consider anterolateral ischemia   Prolonged QT   Abnormal ECG   When compared with ECG of 26-MAY-2024 05:29, (Unconfirmed)   T wave inversion now evident in Inferior leads      Referred By:            Confirmed By:       ECG 12 Lead Chest Pain   Preliminary Result   Test Reason : Chest Pain   Blood Pressure :   */*   mmHG   Vent. Rate :  75 BPM     Atrial Rate :  75 BPM      P-R Int : 146 ms          QRS Dur :  82 ms       QT Int : 404 ms       P-R-T Axes :  17  28 109 degrees      QTc Int : 451 ms      Normal sinus rhythm   T wave abnormality, consider anterolateral ischemia   Abnormal ECG   No previous ECGs available      Referred By: DR MENG           Confirmed By:         Telemetry:  normal sinus rhythm     I have personally looked at both the EKG and the telemetry strips.    Last echocardiogram:  Ordered and pending  "  --------------------------------------------------------------------------------------------------------------------  Labs:  Results from last 7 days   Lab Units 05/26/24  0541   WBC 10*3/mm3 6.89   HEMOGLOBIN g/dL 14.6   HEMATOCRIT % 43.1   MCV fL 86.0   MCHC g/dL 33.9   PLATELETS 10*3/mm3 213         Results from last 7 days   Lab Units 05/26/24  0541   SODIUM mmol/L 141   POTASSIUM mmol/L 3.4*   CHLORIDE mmol/L 106   CO2 mmol/L 25.1   BUN mg/dL 11   CREATININE mg/dL 0.77   CALCIUM mg/dL 9.9   GLUCOSE mg/dL 121*   ALBUMIN g/dL 4.7   BILIRUBIN mg/dL 0.4   ALK PHOS U/L 101   AST (SGOT) U/L 23   ALT (SGPT) U/L 13   Estimated Creatinine Clearance: 83.6 mL/min (by C-G formula based on SCr of 0.77 mg/dL).  No results found for: \"AMMONIA\"  Results from last 7 days   Lab Units 05/26/24  1023 05/26/24  0736 05/26/24  0541   HSTROP T ng/L <6 6 7   PROBNP pg/mL  --   --  162.0         No results found for: \"HGBA1C\", \"POCGLU\"  No results found for: \"TSH\", \"FREET4\"  No results found for: \"PREGTESTUR\", \"PREGSERUM\", \"HCG\", \"HCGQUANT\"  Pain Management Panel           No data to display              Brief Urine Lab Results       None          No results found for: \"BLOODCX\"  No results found for: \"URINECX\"  No results found for: \"WOUNDCX\"  No results found for: \"STOOLCX\"    I have personally looked at the labs and they are summarized above.  ----------------------------------------------------------------------------------------------------------------------  Detailed radiology reports for the last 24 hours:    Imaging Results (Last 24 Hours)       Procedure Component Value Units Date/Time    CT Angiogram Heart With 3D Image [[id number]] Resulted: 05/26/24 1103     Updated: 05/26/24 1103    XR Chest 1 View [[id number]] Collected: 05/26/24 0757     Updated: 05/26/24 0800    Narrative:      CLINICAL HISTORY: Chest pain.     COMPARISON: None available.     TECHNIQUE: Single portable upright AP view of the chest was obtained.   "   FINDINGS: Lungs are clear and there is no pleural effusion or  pneumothorax.  Cardiomediastinal silhouette is normal.  No acute osseous  or upper abdominal abnormality is seen.       Impression:         NO ACUTE ABNORMALITY IN THE CHEST.           This report was finalized on 5/26/2024 7:58 AM by Maite Hanna MD.             I have personally looked at the radiology images and read the final radiology report.    Assessment & Plan    48F PMH Dyslipidemia, Coronary Artery Disease status post PCI, presented to [facility] emergency room 5/26 with complaints of acute onset, non radiating, chest pain.    #Atypical Chest Pain, Acute Coronary Syndrome ruled out  #Dyslipidemia/Coronary Artery Disease status post PCI   - Cardiology consulted; Recommendations pending  - Follow up coronary CTA ordered in emergency room   - Status post Aspirin 324mg, status post home Effient, status post SLN x 1 in emergency room   - Review home medications and resume as indicated   - Will trend troponins and repeat EKG to assess for dynamic changes  - Will order complete echocardiogram for further assessment  - Will admit to telemetry for continued monitoring, strict I/O's, daily weights, trend heart rate and blood pressure    F: NPO for now  E: Monitor & Replace as needed   N: NPO for now  PPx: SQH  Code Status (Patient has no pulse and is not breathing): CPR (Attempt to Resuscitate)  Medical Interventions (Patient has pulse or is breathing): Full Support     Dispo: Pending workup and clinical improvement    *This patient is considered high risk secondary to chest pain, requiring IV narcotics for pain in emergency room, requiring further cardiac workup with imaging.     Edited by: Dio Malhotra MD at 5/26/2024 1140    Dio Malhotra MD  [facility] Hospitalist  05/26/24  11:40 EDT

## 2024-05-26 NOTE — ED NOTES
Patient and linens clean and dry. Patient wristband remains in place. Patient to be transported to [address] [place] [address] via wheelchair by [facility]

## 2024-05-26 NOTE — CASE MANAGEMENT/SOCIAL WORK
Discharge Planning Assessment   [place]     Patient Name: Abi Jesus  MRN: [id number]  Today's Date: 5/26/2024    Admit Date: 5/26/2024    Plan: Pt lives at home with family and plans to return home at discharge family will provide transportation. Pcp is Nino Talbot, she uses [facility] and has [facility]. Pt is independent with adl's and does not use any dme, home health or home o2.   Discharge Needs Assessment       Row Name 05/26/24 1153       Living Environment    Primary Care Provided by [facility]    Quality of Family Relationships helpful;involved;supportive       Resource/Environmental Concerns    Resource/Environmental Concerns none       Transition Planning    Patient/Family Anticipates Transition to home with family    Patient/Family Anticipated Services at Transition none    Transportation Anticipated family or friend will provide       Discharge Needs Assessment    Readmission Within the Last 30 Days no previous admission in last 30 days    Equipment Currently Used at Home none    Concerns to be Addressed no discharge needs identified;denies needs/concerns at this time    Anticipated Changes Related to Illness none    Equipment Needed After Discharge none                   Discharge Plan       Row Name 05/26/24 1154       Plan    Plan Pt lives at home with family and plans to return home at discharge family will provide transportation. Pcp is Nino Talbot, she uses [facility] and has [facility]. Pt is independent with adl's and does not use any dme, home health or home o2.                  Continued Care and Services - Admitted Since 5/26/2024    No active coordination exists for this encounter.       Expected Discharge Date and Time       Expected Discharge Date Expected Discharge Time    May 28, 2024            Demographic Summary       Row Name 05/26/24 1153       General Information    Admission Type observation    Arrived From home    Referral Source emergency department     Reason for Consult discharge planning                    Nava Santillan RN

## 2024-05-27 ENCOUNTER — APPOINTMENT (OUTPATIENT)
Dept: CARDIOLOGY | Facility: HOSPITAL | Age: 49
End: 2024-05-27
Payer: COMMERCIAL

## 2024-05-27 LAB
ALBUMIN SERPL-MCNC: 4 G/DL (ref 3.5–5.2)
ALBUMIN/GLOB SERPL: 1.5 G/DL
ALP SERPL-CCNC: 86 U/L (ref 39–117)
ALT SERPL W P-5'-P-CCNC: 11 U/L (ref 1–33)
ANION GAP SERPL CALCULATED.3IONS-SCNC: 10.3 MMOL/L (ref 5–15)
AST SERPL-CCNC: 22 U/L (ref 1–32)
BILIRUB SERPL-MCNC: 0.4 MG/DL (ref 0–1.2)
BUN SERPL-MCNC: 12 MG/DL (ref 6–20)
BUN/CREAT SERPL: 17.4 (ref 7–25)
CALCIUM SPEC-SCNC: 9 MG/DL (ref 8.6–10.5)
CHLORIDE SERPL-SCNC: 106 MMOL/L (ref 98–107)
CO2 SERPL-SCNC: 22.7 MMOL/L (ref 22–29)
CREAT SERPL-MCNC: 0.69 MG/DL (ref 0.57–1)
DEPRECATED RDW RBC AUTO: 39.8 FL (ref 37–54)
EGFRCR SERPLBLD CKD-EPI 2021: 107.9 ML/MIN/1.73
ERYTHROCYTE [DISTWIDTH] IN BLOOD BY AUTOMATED COUNT: 12.2 % (ref 12.3–15.4)
GLOBULIN UR ELPH-MCNC: 2.6 GM/DL
GLUCOSE SERPL-MCNC: 106 MG/DL (ref 65–99)
HCT VFR BLD AUTO: 37.5 % (ref 34–46.6)
HGB BLD-MCNC: 12.3 G/DL (ref 12–15.9)
MCH RBC QN AUTO: 29.1 PG (ref 26.6–33)
MCHC RBC AUTO-ENTMCNC: 32.8 G/DL (ref 31.5–35.7)
MCV RBC AUTO: 88.9 FL (ref 79–97)
PLATELET # BLD AUTO: 176 10*3/MM3 (ref 140–450)
PMV BLD AUTO: 9.8 FL (ref 6–12)
POTASSIUM SERPL-SCNC: 4 MMOL/L (ref 3.5–5.2)
PROT SERPL-MCNC: 6.6 G/DL (ref 6–8.5)
RBC # BLD AUTO: 4.22 10*6/MM3 (ref 3.77–5.28)
SODIUM SERPL-SCNC: 139 MMOL/L (ref 136–145)
WBC NRBC COR # BLD AUTO: 6.51 10*3/MM3 (ref 3.4–10.8)

## 2024-05-27 PROCEDURE — 25010000002 HEPARIN (PORCINE) PER 1000 UNITS: Performed by: INTERNAL MEDICINE

## 2024-05-27 PROCEDURE — 96372 THER/PROPH/DIAG INJ SC/IM: CPT

## 2024-05-27 PROCEDURE — 94761 N-INVAS EAR/PLS OXIMETRY MLT: CPT

## 2024-05-27 PROCEDURE — 94799 UNLISTED PULMONARY SVC/PX: CPT

## 2024-05-27 PROCEDURE — 80053 COMPREHEN METABOLIC PANEL: CPT | Performed by: INTERNAL MEDICINE

## 2024-05-27 PROCEDURE — 85027 COMPLETE CBC AUTOMATED: CPT | Performed by: INTERNAL MEDICINE

## 2024-05-27 PROCEDURE — 99222 1ST HOSP IP/OBS MODERATE 55: CPT | Performed by: INTERNAL MEDICINE

## 2024-05-27 PROCEDURE — G0378 HOSPITAL OBSERVATION PER HR: HCPCS

## 2024-05-27 PROCEDURE — 75574 CT ANGIO HRT W/3D IMAGE: CPT | Performed by: RADIOLOGY

## 2024-05-27 PROCEDURE — 94664 DEMO&/EVAL PT USE INHALER: CPT

## 2024-05-27 PROCEDURE — 94640 AIRWAY INHALATION TREATMENT: CPT

## 2024-05-27 RX ADMIN — HEPARIN SODIUM 5000 UNITS: 5000 INJECTION INTRAVENOUS; SUBCUTANEOUS at 05:51

## 2024-05-27 RX ADMIN — Medication 10 ML: at 12:21

## 2024-05-27 RX ADMIN — FERROUS SULFATE TAB 325 MG (65 MG ELEMENTAL FE) 325 MG: 325 (65 FE) TAB at 08:08

## 2024-05-27 RX ADMIN — Medication 10 ML: at 21:02

## 2024-05-27 RX ADMIN — ATORVASTATIN CALCIUM 40 MG: 40 TABLET, FILM COATED ORAL at 08:08

## 2024-05-27 RX ADMIN — HEPARIN SODIUM 5000 UNITS: 5000 INJECTION INTRAVENOUS; SUBCUTANEOUS at 15:05

## 2024-05-27 RX ADMIN — ALBUTEROL SULFATE 2.5 MG: 2.5 SOLUTION RESPIRATORY (INHALATION) at 18:29

## 2024-05-27 RX ADMIN — PRASUGREL 10 MG: 10 TABLET, FILM COATED ORAL at 08:08

## 2024-05-27 RX ADMIN — ASPIRIN 81 MG: 81 TABLET, COATED ORAL at 08:08

## 2024-05-27 RX ADMIN — PANTOPRAZOLE SODIUM 40 MG: 40 TABLET, DELAYED RELEASE ORAL at 05:51

## 2024-05-27 RX ADMIN — HEPARIN SODIUM 5000 UNITS: 5000 INJECTION INTRAVENOUS; SUBCUTANEOUS at 21:02

## 2024-05-27 RX ADMIN — METOPROLOL TARTRATE 25 MG: 25 TABLET, FILM COATED ORAL at 21:02

## 2024-05-27 RX ADMIN — RANOLAZINE 1000 MG: 500 TABLET, FILM COATED, EXTENDED RELEASE ORAL at 08:08

## 2024-05-27 RX ADMIN — ALBUTEROL SULFATE 2.5 MG: 2.5 SOLUTION RESPIRATORY (INHALATION) at 07:14

## 2024-05-27 NOTE — PROGRESS NOTES
[facility] HOSPITALIST PROGRESS NOTE     Patient Identification:  Name:  Abi Jesus  Age:  48 y.o.  Sex:  female  :  1975  MRN:  [id number]  Visit Number:  [id number]  ROOM: [address] [place] [address]     Primary Care Provider:  Nino Talbot MD    Length of stay in inpatient status:  0    Subjective     Chief Compliant:    Chief Complaint   Patient presents with    Chest Pain       History of Presenting Illness:    Patient reports continued intermittent chest discomfort. It feels different than prior heart attack but she is still concerned about recurrent CAD.     ROS:  Otherwise 10 point ROS negative other than documented above in HPI.     Objective     Current Hospital Meds:aspirin, 81 mg, Oral, Daily  atorvastatin, 40 mg, Oral, Daily  ferrous sulfate, 325 mg, Oral, Daily With Breakfast  heparin (porcine), 5,000 Units, Subcutaneous, Q8H  metoprolol tartrate, 25 mg, Oral, BID  pantoprazole, 40 mg, Oral, Q AM  prasugrel, 10 mg, Oral, Daily  ranolazine, 1,000 mg, Oral, Daily  sodium chloride, 10 mL, Intravenous, Q12H         Current Antimicrobial Therapy:  Anti-Infectives (From admission, onward)      None          Current Diuretic Therapy:  Diuretics (From admission, onward)      None          ----------------------------------------------------------------------------------------------------------------------  Vital Signs:  Temp:  [97.3 °F (36.3 °C)-98.4 °F (36.9 °C)] 97.9 °F (36.6 °C)  Heart Rate:  [57-89] 86  Resp:  [16-18] 16  BP: (110-137)/(59-95) 135/80  SpO2:  [92 %-98 %] 98 %  on   ;   Device (Oxygen Therapy): room air  Body mass index is 28.4 kg/m².    Wt Readings from Last 3 Encounters:   24 72.7 kg (160 lb 4.8 oz)   07/29/15 69.4 kg (153 lb)     Intake & Output (last 3 days)          0700    P.O.    960    Total Intake(mL/kg)    960 (13.2)    Urine (mL/kg/hr)   0 (0)     Stool   0     Total Output   0      Net   0 +960            Urine Unmeasured Occurrence   3 x           Diet: Regular/House, Cardiac; Healthy Heart (2-3 Na+); Fluid Consistency: Thin (IDDSI 0)  ----------------------------------------------------------------------------------------------------------------------  Physical exam:  Constitutional:  Well-developed and well-nourished.  No respiratory distress.      HENT:  Head:  Normocephalic and atraumatic.  Mouth:  Moist mucous membranes.    Eyes:  Conjunctivae and EOM are normal. No scleral icterus.    Neck:  Neck supple.  No JVD present.    Cardiovascular:  Normal rate, regular rhythm and normal heart sounds with no murmur.  Pulmonary/Chest:  No respiratory distress, no wheezes, no crackles, with normal breath sounds and good air movement.  Abdominal:  Soft.  Bowel sounds are normal.  No distension and no tenderness.   Musculoskeletal:  No edema, no tenderness, and no deformity.  No red or swollen joints anywhere.    Neurological:  Alert and oriented to person, place, and time.  No cranial nerve deficit.  No tongue deviation.  No facial droop.  No slurred speech.   Skin:  Skin is warm and dry. No rash noted. No pallor.   Peripheral vascular:  Pulses in all 4 extremities with no clubbing, no cyanosis, no edema.  ----------------------------------------------------------------------------------------------------------------------  Tele:    ----------------------------------------------------------------------------------------------------------------------  Results from last 7 days   Lab Units 05/27/24 0033 05/26/24  0541   WBC 10*3/mm3 6.51 6.89   HEMOGLOBIN g/dL 12.3 14.6   HEMATOCRIT % 37.5 43.1   MCV fL 88.9 86.0   MCHC g/dL 32.8 33.9   PLATELETS 10*3/mm3 176 213         Results from last 7 days   Lab Units 05/27/24 0033 05/26/24  0541   SODIUM mmol/L 139 141   POTASSIUM mmol/L 4.0 3.4*   CHLORIDE mmol/L 106 106   CO2 mmol/L 22.7 25.1   BUN mg/dL 12 11   CREATININE mg/dL 0.69 0.77   CALCIUM mg/dL  "9.0 9.9   GLUCOSE mg/dL 106* 121*   ALBUMIN g/dL 4.0 4.7   BILIRUBIN mg/dL 0.4 0.4   ALK PHOS U/L 86 101   AST (SGOT) U/L 22 23   ALT (SGPT) U/L 11 13   Estimated Creatinine Clearance: 96.3 mL/min (by C-G formula based on SCr of 0.69 mg/dL).  No results found for: \"AMMONIA\"  Results from last 7 days   Lab Units 05/26/24  1441 05/26/24  1023 05/26/24  0736   HSTROP T ng/L <6 <6 6     Results from last 7 days   Lab Units 05/26/24  0541   PROBNP pg/mL 162.0         No results found for: \"HGBA1C\", \"POCGLU\"  No results found for: \"TSH\", \"FREET4\"  No results found for: \"PREGTESTUR\", \"PREGSERUM\", \"HCG\", \"HCGQUANT\"  Pain Management Panel           No data to display              Brief Urine Lab Results       None          No results found for: \"BLOODCX\"  No results found for: \"URINECX\"  No results found for: \"WOUNDCX\"  No results found for: \"STOOLCX\"  No results found for: \"RESPCX\"  No results found for: \"AFBCX\"        I have personally looked at the labs and they are summarized above.  ----------------------------------------------------------------------------------------------------------------------  Detailed radiology reports for the last 24 hours:    Imaging Results (Last 24 Hours)       Procedure Component Value Units Date/Time    CT Angiogram Heart With 3D Image [[id number]] Collected: 05/27/24 0806     Updated: 05/27/24 0813    Narrative:      EXAM:    CT Angiography Heart With Intravenous Contrast     EXAM DATE:    5/26/2024 11:03 AM     CLINICAL HISTORY:    Unstable angina T wave inversion new on EKG, history of PCI stent to  the LAD; I20.0-Unstable angina; Z95.5-Presence of coronary angioplasty  implant and graft; E87.6-Hypokalemia     TECHNIQUE:    Axial computed tomographic angiography images of the coronary arteries  with intravenous contrast.  Sublingual nitroglycerine for coronary  vasodilation and IV metoprolol to reduce heart rate were administered as  needed.  This CT exam was performed using one or more " of the following  dose reduction techniques:  automated exposure control, adjustment of  the mA and/or kV according to patient size, and/or use of iterative  reconstruction technique.    3D and MIP reconstructed images were created and reviewed.     COMPARISON:    None.     FINDINGS:    LEFT MAIN CORONARY ARTERY:  Unremarkable as visualized.  The left main  coronary artery bifurcates in LAD and LCX.  It is patent with no  evidence of plaque or stenosis.    LEFT ANTERIOR DESCENDING ARTERY:  LAD stent noted. The stent is  somewhat limited in evaluation but no definite occlusion is visualized.  The mid proximal LAD shows focal calcification probably contributing to  about 25% stenosis.  LAD calcium score of 41.    LEFT CIRCUMFLEX ARTERY:  Unremarkable as visualized.  Patent with no  evidence of plaque or stenosis.    RIGHT CORONARY ARTERY:  Unremarkable as visualized.  Patent with no  evidence of plaque or stenosis.  It gives off a patent posterior  descending artery and a patent posterior left ventricular branch.       CARDIAC VALVES:  Unremarkable as visualized.  No evidence of  calcification in the aortic or mitral valve leaflets.    PERICARDIUM:  Unremarkable as visualized.  Contour is preserved with  no effusion, thickening or calcification.    CARDIAC FUNCTION:  Unremarkable as visualized.  Preserved global and  regional wall motion.       AORTA:  No acute findings.  No thoracic aortic aneurysm.  No  dissection.    PULMONARY ARTERIES:  Unremarkable as visualized.  No pulmonary  embolism.    LUNGS AND PLEURAL SPACES:  Unremarkable as visualized.  No mass.  No  consolidation or edema.  No pleural effusion or thickening.  No  pneumothorax.    MEDIASTINUM:  Unremarkable as visualized.  No mass.    OTHER FINDINGS:  Total calcium score of 48.  No functional CAD  analysis.       Impression:        LAD stent noted. The stent is somewhat limited in evaluation but no  definite occlusion is visualized. The mid proximal LAD  shows focal  calcification probably contributing to about 25% stenosis.     ASSESSMENT:    CAD RADS 1S P1        This report was finalized on 5/27/2024 8:11 AM by Dr. Oz Guerra MD.             Assessment & Plan      #Atypical chest pain   #Hx of CAD s/p PCI to LAD   #HLD  - Trops negative X2.   - Cardiology consulted and offered LHC given continued symptoms. Patient opting for LHC tomorrow. NPO after midnight.   - Continue ASA, statin  - Etiology of discomfort unclear. D-dimer wnl on admission. Chest x-niko unrevealing. Possibly costochondritis if LHC is unrevealing.     Code status: Full     Dispo: Pending LHC      VTE Prophylaxis:   Mechanical Order History:       None          Pharmalogical Order History:        Ordered     Dose Route Frequency Stop    05/26/24 1603  heparin (porcine) 5000 UNIT/ML injection 5,000 Units         5,000 Units SC Every 8 Hours Scheduled --                    Michael Alcaraz MD  [facility] Hospitalist  05/27/24  15:11 EDT

## 2024-05-27 NOTE — H&P (VIEW-ONLY)
Consults  Date of Admit: 5/26/2024  Date of Consult: 05/27/24  No ref. provider found  Abi Jesus  1975  Consulting Physician: Dr. Mcmullen    Cardiology consultation    Reason for consultation: Precordial pain  Assessment:  Angina class III in setting of previous coronary artery disease status post stenting of the LAD  Dyslipidemia  Essential hypertension      Recommendations:  Discussed results of CT coronary angiogram with the patient.  She reports she has still been having persistent chest pains throughout the night and is still very concerned for underlying coronary artery disease.  CT coronary angiogram did report that stent was limited so is reasonable to proceed with left heart catheterization.        History of Present Illness    Subjective     Chief Complaint   Patient presents with    Chest Pain       Abi Jesus is a 48 y.o. female with past medical history significant for coronary artery disease status post PCI to the LAD in 2020 by Dr. Kern now following with Dr. Hart.  She also has history of dyslipidemia and essential hypertension.  Ms. Torrez presented [facility] emergency department as she developed chest pains in the center of her chest that was nonradiating.  CT coronary angiogram was performed in the ER which showed overall nonobstructive disease but did note limited evaluation of her previous stent although no occlusion was seen.  Speaking to Ms. Jesus today she still reports she has been having chest pains throughout the night and is still concern for underlying obstructive CAD and requesting to proceed with left heart catheterization.            History reviewed. No pertinent past medical history.  History reviewed. No pertinent surgical history.  History reviewed. No pertinent family history.  Social History     Tobacco Use    Smoking status: Never    Smokeless tobacco: Never   Vaping Use    Vaping status: Every Day    Substances: Nicotine, Flavoring    Devices:  Disposable   Substance Use Topics    Alcohol use: Never    Drug use: Never     Medications Prior to Admission   Medication Sig Dispense Refill Last Dose    amoxicillin-clavulanate (AUGMENTIN) 875-125 MG per tablet Take 1 tablet by mouth 2 (Two) Times a Day.   5/25/2024    aspirin 81 MG EC tablet Take 1 tablet by mouth Daily.   5/25/2024    atorvastatin (LIPITOR) 40 MG tablet Take 1 tablet by mouth Daily.   5/25/2024    ferrous sulfate 325 (65 FE) MG tablet Take 1 tablet by mouth Daily With Breakfast.   5/25/2024    metoprolol tartrate (LOPRESSOR) 25 MG tablet Take 1 tablet by mouth 2 (Two) Times a Day.   5/25/2024    omeprazole (priLOSEC) 20 MG capsule Take 1 capsule by mouth Daily.   5/25/2024    prasugrel (EFFIENT) 10 MG tablet Take 1 tablet by mouth Daily.   5/25/2024    ranolazine (RANEXA) 1000 MG 12 hr tablet Take 1 tablet by mouth Daily.   5/25/2024    albuterol sulfate  (90 Base) MCG/ACT inhaler Inhale 2 puffs Every 6 (Six) Hours As Needed for Wheezing.   Unknown    fluticasone (FLONASE) 50 MCG/ACT nasal spray 2 sprays into the nostril(s) as directed by provider Daily As Needed for Allergies.   Unknown     Allergies:  Sulfa antibiotics      Current Facility-Administered Medications:     albuterol (PROVENTIL) nebulizer solution 0.083% 2.5 mg/3mL, 2.5 mg, Nebulization, Q6H PRN, Ignacia García PA-C, 2.5 mg at 05/27/24 0714    aspirin EC tablet 81 mg, 81 mg, Oral, Daily, Ignacia García PA-C, 81 mg at 05/27/24 0808    atorvastatin (LIPITOR) tablet 40 mg, 40 mg, Oral, Daily, Ignacia García PA-C, 40 mg at 05/27/24 0808    sennosides-docusate (PERICOLACE) 8.6-50 MG per tablet 2 tablet, 2 tablet, Oral, BID PRN **AND** polyethylene glycol (MIRALAX) packet 17 g, 17 g, Oral, Daily PRN **AND** bisacodyl (DULCOLAX) EC tablet 5 mg, 5 mg, Oral, Daily PRN **AND** bisacodyl (DULCOLAX) suppository 10 mg, 10 mg, Rectal, Daily PRN, Dio Malhotra MD    calcium carbonate (TUMS) chewable tablet 500 mg (200 mg  elemental), 1 tablet, Oral, TID PRN, Dio Malhotra MD    ferrous sulfate tablet 325 mg, 325 mg, Oral, Daily With Breakfast, Ignacia García PA-C, 325 mg at 05/27/24 0808    fluticasone (FLONASE) 50 MCG/ACT nasal spray 2 spray, 2 spray, Nasal, Daily PRN, Ignacia García PA-C    heparin (porcine) 5000 UNIT/ML injection 5,000 Units, 5,000 Units, Subcutaneous, Q8H, Dio Malhotra MD, 5,000 Units at 05/27/24 0551    metoprolol tartrate (LOPRESSOR) tablet 25 mg, 25 mg, Oral, BID, Ignacia García PA-C    pantoprazole (PROTONIX) EC tablet 40 mg, 40 mg, Oral, Q AM, Dio Malhotra MD, 40 mg at 05/27/24 0551    prasugrel (EFFIENT) tablet 10 mg, 10 mg, Oral, Daily, Ignacia García PA-C, 10 mg at 05/27/24 0808    ranolazine (RANEXA) 12 hr tablet 1,000 mg, 1,000 mg, Oral, Daily, Ignacia García PA-C, 1,000 mg at 05/27/24 0808    sodium chloride 0.9 % flush 10 mL, 10 mL, Intravenous, PRN, Vishal Ding MD    sodium chloride 0.9 % flush 10 mL, 10 mL, Intravenous, Q12H, Dio Malhotra MD, 10 mL at 05/27/24 1221    sodium chloride 0.9 % flush 10 mL, 10 mL, Intravenous, PRN, Dio Malhotra MD    sodium chloride 0.9 % infusion 40 mL, 40 mL, Intravenous, PRN, Dio Malhotra MD    Review of Systems   Constitutional:  Negative for chills and diaphoresis.   HENT:  Negative for congestion and facial swelling.    Respiratory:  Positive for chest tightness. Negative for shortness of breath.    Cardiovascular:  Positive for chest pain. Negative for palpitations.   Gastrointestinal:  Negative for abdominal pain and blood in stool.   Genitourinary:  Negative for flank pain and genital sores.   Musculoskeletal:  Negative for arthralgias and back pain.   Skin:  Negative for color change.   Neurological:  Negative for headaches.   Hematological:  Negative for adenopathy. Does not bruise/bleed easily.   Psychiatric/Behavioral:  Negative for agitation and behavioral problems.          Objective      Vital Signs  Temp:  [97.3 °F (36.3  °C)-98.4 °F (36.9 °C)] 97.9 °F (36.6 °C)  Heart Rate:  [53-89] 86  Resp:  [16-18] 16  BP: (110-139)/(50-95) 135/80  Body mass index is 28.4 kg/m².    Intake/Output Summary (Last 24 hours) at 5/27/2024 1254  Last data filed at 5/27/2024 0500  Gross per 24 hour   Intake --   Output 0 ml   Net 0 ml       Physical Exam  Constitutional:       General: She is not in acute distress.     Appearance: Normal appearance. She is well-developed and normal weight.   HENT:      Head: Normocephalic and atraumatic.   Eyes:      General: Lids are normal.      Conjunctiva/sclera: Conjunctivae normal.      Pupils: Pupils are equal, round, and reactive to light.   Neck:      Vascular: No carotid bruit or JVD.   Cardiovascular:      Rate and Rhythm: Normal rate and regular rhythm.      Pulses: Normal pulses.           Radial pulses are 2+ on the right side and 2+ on the left side.        Dorsalis pedis pulses are 2+ on the right side and 2+ on the left side.        Posterior tibial pulses are 2+ on the right side and 2+ on the left side.      Heart sounds: Normal heart sounds, S1 normal and S2 normal. No murmur heard.  Pulmonary:      Effort: Pulmonary effort is normal.      Breath sounds: Normal breath sounds.   Abdominal:      General: Bowel sounds are normal.      Palpations: Abdomen is soft. There is no hepatomegaly or splenomegaly.   Musculoskeletal:         General: No swelling. Normal range of motion.      Cervical back: Normal range of motion and neck supple.      Right lower leg: No edema.      Left lower leg: No edema.   Skin:     General: Skin is warm and dry.   Neurological:      General: No focal deficit present.      Mental Status: She is alert and oriented to person, place, and time. Mental status is at baseline.   Psychiatric:         Mood and Affect: Mood normal.         Speech: Speech normal.         Behavior: Behavior normal.         Thought Content: Thought content normal.         Judgment: Judgment normal.  "          Results Review:   I reviewed the patient's new clinical results.  Results from last 7 days   Lab Units 05/26/24  1441 05/26/24  1023 05/26/24  0736 05/26/24  0541   HSTROP T ng/L <6 <6 6 7     Results from last 7 days   Lab Units 05/27/24  0033 05/26/24  0541   WBC 10*3/mm3 6.51 6.89   HEMOGLOBIN g/dL 12.3 14.6   PLATELETS 10*3/mm3 176 213     Results from last 7 days   Lab Units 05/27/24  0033 05/26/24  0541   SODIUM mmol/L 139 141   POTASSIUM mmol/L 4.0 3.4*   CHLORIDE mmol/L 106 106   CO2 mmol/L 22.7 25.1   BUN mg/dL 12 11   CREATININE mg/dL 0.69 0.77   CALCIUM mg/dL 9.0 9.9   GLUCOSE mg/dL 106* 121*   ALT (SGPT) U/L 11 13   AST (SGOT) U/L 22 23     No results found for: \"INR\"  No results found for: \"MG\"  No results found for: \"TSH\", \"PSA\", \"CHLPL\", \"TRIG\", \"HDL\", \"LDL\"   No results found for: \"BNP\"    EKG:     Imaging Results (Last 72 Hours)       Procedure Component Value Units Date/Time    CT Angiogram Heart With 3D Image [[id number]] Collected: 05/27/24 0806     Updated: 05/27/24 0813    Narrative:      EXAM:    CT Angiography Heart With Intravenous Contrast     EXAM DATE:    5/26/2024 11:03 AM     CLINICAL HISTORY:    Unstable angina T wave inversion new on EKG, history of PCI stent to  the LAD; I20.0-Unstable angina; Z95.5-Presence of coronary angioplasty  implant and graft; E87.6-Hypokalemia     TECHNIQUE:    Axial computed tomographic angiography images of the coronary arteries  with intravenous contrast.  Sublingual nitroglycerine for coronary  vasodilation and IV metoprolol to reduce heart rate were administered as  needed.  This CT exam was performed using one or more of the following  dose reduction techniques:  automated exposure control, adjustment of  the mA and/or kV according to patient size, and/or use of iterative  reconstruction technique.    3D and MIP reconstructed images were created and reviewed.     COMPARISON:    None.     FINDINGS:    LEFT MAIN CORONARY ARTERY:  Unremarkable as " visualized.  The left main  coronary artery bifurcates in LAD and LCX.  It is patent with no  evidence of plaque or stenosis.    LEFT ANTERIOR DESCENDING ARTERY:  LAD stent noted. The stent is  somewhat limited in evaluation but no definite occlusion is visualized.  The mid proximal LAD shows focal calcification probably contributing to  about 25% stenosis.  LAD calcium score of 41.    LEFT CIRCUMFLEX ARTERY:  Unremarkable as visualized.  Patent with no  evidence of plaque or stenosis.    RIGHT CORONARY ARTERY:  Unremarkable as visualized.  Patent with no  evidence of plaque or stenosis.  It gives off a patent posterior  descending artery and a patent posterior left ventricular branch.       CARDIAC VALVES:  Unremarkable as visualized.  No evidence of  calcification in the aortic or mitral valve leaflets.    PERICARDIUM:  Unremarkable as visualized.  Contour is preserved with  no effusion, thickening or calcification.    CARDIAC FUNCTION:  Unremarkable as visualized.  Preserved global and  regional wall motion.       AORTA:  No acute findings.  No thoracic aortic aneurysm.  No  dissection.    PULMONARY ARTERIES:  Unremarkable as visualized.  No pulmonary  embolism.    LUNGS AND PLEURAL SPACES:  Unremarkable as visualized.  No mass.  No  consolidation or edema.  No pleural effusion or thickening.  No  pneumothorax.    MEDIASTINUM:  Unremarkable as visualized.  No mass.    OTHER FINDINGS:  Total calcium score of 48.  No functional CAD  analysis.       Impression:        LAD stent noted. The stent is somewhat limited in evaluation but no  definite occlusion is visualized. The mid proximal LAD shows focal  calcification probably contributing to about 25% stenosis.     ASSESSMENT:    CAD RADS 1S P1        This report was finalized on 5/27/2024 8:11 AM by Dr. Oz Guerra MD.       XR Chest 1 View [[id number]] Collected: 05/26/24 0757     Updated: 05/26/24 0800    Narrative:      CLINICAL HISTORY: Chest pain.      COMPARISON: None available.     TECHNIQUE: Single portable upright AP view of the chest was obtained.     FINDINGS: Lungs are clear and there is no pleural effusion or  pneumothorax.  Cardiomediastinal silhouette is normal.  No acute osseous  or upper abdominal abnormality is seen.       Impression:         NO ACUTE ABNORMALITY IN THE CHEST.           This report was finalized on 5/26/2024 7:58 AM by Maite Hanna MD.                Thank you very much for asking us to be involved in this patient's care.  We will follow along with you.    Poncho Gaytan PA-C   05/27/24  12:54 EDT    Electronically signed by Poncho Gaytan PA-C, 05/27/24, 12:54 PM EDT.     Patient seen and examined.  Chart reviewed.  Agree with patient's charting and assessment plan by SHALINI  Patient still having chest pressure and dyspnea on exertion.  CTA coronaries was negative for any acute lesion.  On exam  Alert awake and oriented no distress, sensorimotor system are intact  Mucous membranes are moist  Chest no wheezing or crackles  Regular rate rhythm, normal S1-S2, no rub gallop murmur  Abdomen nontender nondistended  No edema  Assessment and plan  #1 cardiac.  Patient with history of coronary artery disease with PCI to the LAD in the past.  Negative CTA for acute lesion.  Patient due to worsening symptoms wants to get invasive evaluation done.  Will schedule for catheter tomorrow.  Continue antianginal medication and current therapy for now.  Lipid-lowering medication to continue  .Electronically signed by Lenard Mcmullen MD, 05/27/24, 3:21 PM EDT.

## 2024-05-27 NOTE — CONSULTS
Consults  Date of Admit: 5/26/2024  Date of Consult: 05/27/24  No ref. provider found  Abi Jesus  1975  Consulting Physician: Dr. Mcmullen    Cardiology consultation    Reason for consultation: Precordial pain  Assessment:  Angina class III in setting of previous coronary artery disease status post stenting of the LAD  Dyslipidemia  Essential hypertension      Recommendations:  Discussed results of CT coronary angiogram with the patient.  She reports she has still been having persistent chest pains throughout the night and is still very concerned for underlying coronary artery disease.  CT coronary angiogram did report that stent was limited so is reasonable to proceed with left heart catheterization.        History of Present Illness    Subjective     Chief Complaint   Patient presents with    Chest Pain       Abi Jesus is a 48 y.o. female with past medical history significant for coronary artery disease status post PCI to the LAD in 2020 by Dr. Kern now following with Dr. Hart.  She also has history of dyslipidemia and essential hypertension.  Ms. Torrez presented [facility] emergency department as she developed chest pains in the center of her chest that was nonradiating.  CT coronary angiogram was performed in the ER which showed overall nonobstructive disease but did note limited evaluation of her previous stent although no occlusion was seen.  Speaking to Ms. Jesus today she still reports she has been having chest pains throughout the night and is still concern for underlying obstructive CAD and requesting to proceed with left heart catheterization.            History reviewed. No pertinent past medical history.  History reviewed. No pertinent surgical history.  History reviewed. No pertinent family history.  Social History     Tobacco Use    Smoking status: Never    Smokeless tobacco: Never   Vaping Use    Vaping status: Every Day    Substances: Nicotine, Flavoring    Devices:  Disposable   Substance Use Topics    Alcohol use: Never    Drug use: Never     Medications Prior to Admission   Medication Sig Dispense Refill Last Dose    amoxicillin-clavulanate (AUGMENTIN) 875-125 MG per tablet Take 1 tablet by mouth 2 (Two) Times a Day.   5/25/2024    aspirin 81 MG EC tablet Take 1 tablet by mouth Daily.   5/25/2024    atorvastatin (LIPITOR) 40 MG tablet Take 1 tablet by mouth Daily.   5/25/2024    ferrous sulfate 325 (65 FE) MG tablet Take 1 tablet by mouth Daily With Breakfast.   5/25/2024    metoprolol tartrate (LOPRESSOR) 25 MG tablet Take 1 tablet by mouth 2 (Two) Times a Day.   5/25/2024    omeprazole (priLOSEC) 20 MG capsule Take 1 capsule by mouth Daily.   5/25/2024    prasugrel (EFFIENT) 10 MG tablet Take 1 tablet by mouth Daily.   5/25/2024    ranolazine (RANEXA) 1000 MG 12 hr tablet Take 1 tablet by mouth Daily.   5/25/2024    albuterol sulfate  (90 Base) MCG/ACT inhaler Inhale 2 puffs Every 6 (Six) Hours As Needed for Wheezing.   Unknown    fluticasone (FLONASE) 50 MCG/ACT nasal spray 2 sprays into the nostril(s) as directed by provider Daily As Needed for Allergies.   Unknown     Allergies:  Sulfa antibiotics      Current Facility-Administered Medications:     albuterol (PROVENTIL) nebulizer solution 0.083% 2.5 mg/3mL, 2.5 mg, Nebulization, Q6H PRN, Ignacia García PA-C, 2.5 mg at 05/27/24 0714    aspirin EC tablet 81 mg, 81 mg, Oral, Daily, Ignacia García PA-C, 81 mg at 05/27/24 0808    atorvastatin (LIPITOR) tablet 40 mg, 40 mg, Oral, Daily, Ignacia García PA-C, 40 mg at 05/27/24 0808    sennosides-docusate (PERICOLACE) 8.6-50 MG per tablet 2 tablet, 2 tablet, Oral, BID PRN **AND** polyethylene glycol (MIRALAX) packet 17 g, 17 g, Oral, Daily PRN **AND** bisacodyl (DULCOLAX) EC tablet 5 mg, 5 mg, Oral, Daily PRN **AND** bisacodyl (DULCOLAX) suppository 10 mg, 10 mg, Rectal, Daily PRN, Dio Malhotra MD    calcium carbonate (TUMS) chewable tablet 500 mg (200 mg  elemental), 1 tablet, Oral, TID PRN, Dio Malhotra MD    ferrous sulfate tablet 325 mg, 325 mg, Oral, Daily With Breakfast, Ignacia García PA-C, 325 mg at 05/27/24 0808    fluticasone (FLONASE) 50 MCG/ACT nasal spray 2 spray, 2 spray, Nasal, Daily PRN, Ignacia García PA-C    heparin (porcine) 5000 UNIT/ML injection 5,000 Units, 5,000 Units, Subcutaneous, Q8H, Dio Malhotra MD, 5,000 Units at 05/27/24 0551    metoprolol tartrate (LOPRESSOR) tablet 25 mg, 25 mg, Oral, BID, Ignacia García PA-C    pantoprazole (PROTONIX) EC tablet 40 mg, 40 mg, Oral, Q AM, Dio Malhotra MD, 40 mg at 05/27/24 0551    prasugrel (EFFIENT) tablet 10 mg, 10 mg, Oral, Daily, Ignacia García PA-C, 10 mg at 05/27/24 0808    ranolazine (RANEXA) 12 hr tablet 1,000 mg, 1,000 mg, Oral, Daily, Ignacia García PA-C, 1,000 mg at 05/27/24 0808    sodium chloride 0.9 % flush 10 mL, 10 mL, Intravenous, PRN, Vishal Ding MD    sodium chloride 0.9 % flush 10 mL, 10 mL, Intravenous, Q12H, Dio Malhotra MD, 10 mL at 05/27/24 1221    sodium chloride 0.9 % flush 10 mL, 10 mL, Intravenous, PRN, Dio Malhotra MD    sodium chloride 0.9 % infusion 40 mL, 40 mL, Intravenous, PRN, Dio Malhotra MD    Review of Systems   Constitutional:  Negative for chills and diaphoresis.   HENT:  Negative for congestion and facial swelling.    Respiratory:  Positive for chest tightness. Negative for shortness of breath.    Cardiovascular:  Positive for chest pain. Negative for palpitations.   Gastrointestinal:  Negative for abdominal pain and blood in stool.   Genitourinary:  Negative for flank pain and genital sores.   Musculoskeletal:  Negative for arthralgias and back pain.   Skin:  Negative for color change.   Neurological:  Negative for headaches.   Hematological:  Negative for adenopathy. Does not bruise/bleed easily.   Psychiatric/Behavioral:  Negative for agitation and behavioral problems.          Objective      Vital Signs  Temp:  [97.3 °F (36.3  °C)-98.4 °F (36.9 °C)] 97.9 °F (36.6 °C)  Heart Rate:  [53-89] 86  Resp:  [16-18] 16  BP: (110-139)/(50-95) 135/80  Body mass index is 28.4 kg/m².    Intake/Output Summary (Last 24 hours) at 5/27/2024 1254  Last data filed at 5/27/2024 0500  Gross per 24 hour   Intake --   Output 0 ml   Net 0 ml       Physical Exam  Constitutional:       General: She is not in acute distress.     Appearance: Normal appearance. She is well-developed and normal weight.   HENT:      Head: Normocephalic and atraumatic.   Eyes:      General: Lids are normal.      Conjunctiva/sclera: Conjunctivae normal.      Pupils: Pupils are equal, round, and reactive to light.   Neck:      Vascular: No carotid bruit or JVD.   Cardiovascular:      Rate and Rhythm: Normal rate and regular rhythm.      Pulses: Normal pulses.           Radial pulses are 2+ on the right side and 2+ on the left side.        Dorsalis pedis pulses are 2+ on the right side and 2+ on the left side.        Posterior tibial pulses are 2+ on the right side and 2+ on the left side.      Heart sounds: Normal heart sounds, S1 normal and S2 normal. No murmur heard.  Pulmonary:      Effort: Pulmonary effort is normal.      Breath sounds: Normal breath sounds.   Abdominal:      General: Bowel sounds are normal.      Palpations: Abdomen is soft. There is no hepatomegaly or splenomegaly.   Musculoskeletal:         General: No swelling. Normal range of motion.      Cervical back: Normal range of motion and neck supple.      Right lower leg: No edema.      Left lower leg: No edema.   Skin:     General: Skin is warm and dry.   Neurological:      General: No focal deficit present.      Mental Status: She is alert and oriented to person, place, and time. Mental status is at baseline.   Psychiatric:         Mood and Affect: Mood normal.         Speech: Speech normal.         Behavior: Behavior normal.         Thought Content: Thought content normal.         Judgment: Judgment normal.  "          Results Review:   I reviewed the patient's new clinical results.  Results from last 7 days   Lab Units 05/26/24  1441 05/26/24  1023 05/26/24  0736 05/26/24  0541   HSTROP T ng/L <6 <6 6 7     Results from last 7 days   Lab Units 05/27/24  0033 05/26/24  0541   WBC 10*3/mm3 6.51 6.89   HEMOGLOBIN g/dL 12.3 14.6   PLATELETS 10*3/mm3 176 213     Results from last 7 days   Lab Units 05/27/24  0033 05/26/24  0541   SODIUM mmol/L 139 141   POTASSIUM mmol/L 4.0 3.4*   CHLORIDE mmol/L 106 106   CO2 mmol/L 22.7 25.1   BUN mg/dL 12 11   CREATININE mg/dL 0.69 0.77   CALCIUM mg/dL 9.0 9.9   GLUCOSE mg/dL 106* 121*   ALT (SGPT) U/L 11 13   AST (SGOT) U/L 22 23     No results found for: \"INR\"  No results found for: \"MG\"  No results found for: \"TSH\", \"PSA\", \"CHLPL\", \"TRIG\", \"HDL\", \"LDL\"   No results found for: \"BNP\"    EKG:     Imaging Results (Last 72 Hours)       Procedure Component Value Units Date/Time    CT Angiogram Heart With 3D Image [[id number]] Collected: 05/27/24 0806     Updated: 05/27/24 0813    Narrative:      EXAM:    CT Angiography Heart With Intravenous Contrast     EXAM DATE:    5/26/2024 11:03 AM     CLINICAL HISTORY:    Unstable angina T wave inversion new on EKG, history of PCI stent to  the LAD; I20.0-Unstable angina; Z95.5-Presence of coronary angioplasty  implant and graft; E87.6-Hypokalemia     TECHNIQUE:    Axial computed tomographic angiography images of the coronary arteries  with intravenous contrast.  Sublingual nitroglycerine for coronary  vasodilation and IV metoprolol to reduce heart rate were administered as  needed.  This CT exam was performed using one or more of the following  dose reduction techniques:  automated exposure control, adjustment of  the mA and/or kV according to patient size, and/or use of iterative  reconstruction technique.    3D and MIP reconstructed images were created and reviewed.     COMPARISON:    None.     FINDINGS:    LEFT MAIN CORONARY ARTERY:  Unremarkable as " visualized.  The left main  coronary artery bifurcates in LAD and LCX.  It is patent with no  evidence of plaque or stenosis.    LEFT ANTERIOR DESCENDING ARTERY:  LAD stent noted. The stent is  somewhat limited in evaluation but no definite occlusion is visualized.  The mid proximal LAD shows focal calcification probably contributing to  about 25% stenosis.  LAD calcium score of 41.    LEFT CIRCUMFLEX ARTERY:  Unremarkable as visualized.  Patent with no  evidence of plaque or stenosis.    RIGHT CORONARY ARTERY:  Unremarkable as visualized.  Patent with no  evidence of plaque or stenosis.  It gives off a patent posterior  descending artery and a patent posterior left ventricular branch.       CARDIAC VALVES:  Unremarkable as visualized.  No evidence of  calcification in the aortic or mitral valve leaflets.    PERICARDIUM:  Unremarkable as visualized.  Contour is preserved with  no effusion, thickening or calcification.    CARDIAC FUNCTION:  Unremarkable as visualized.  Preserved global and  regional wall motion.       AORTA:  No acute findings.  No thoracic aortic aneurysm.  No  dissection.    PULMONARY ARTERIES:  Unremarkable as visualized.  No pulmonary  embolism.    LUNGS AND PLEURAL SPACES:  Unremarkable as visualized.  No mass.  No  consolidation or edema.  No pleural effusion or thickening.  No  pneumothorax.    MEDIASTINUM:  Unremarkable as visualized.  No mass.    OTHER FINDINGS:  Total calcium score of 48.  No functional CAD  analysis.       Impression:        LAD stent noted. The stent is somewhat limited in evaluation but no  definite occlusion is visualized. The mid proximal LAD shows focal  calcification probably contributing to about 25% stenosis.     ASSESSMENT:    CAD RADS 1S P1        This report was finalized on 5/27/2024 8:11 AM by Dr. Oz Guerra MD.       XR Chest 1 View [[id number]] Collected: 05/26/24 0757     Updated: 05/26/24 0800    Narrative:      CLINICAL HISTORY: Chest pain.      COMPARISON: None available.     TECHNIQUE: Single portable upright AP view of the chest was obtained.     FINDINGS: Lungs are clear and there is no pleural effusion or  pneumothorax.  Cardiomediastinal silhouette is normal.  No acute osseous  or upper abdominal abnormality is seen.       Impression:         NO ACUTE ABNORMALITY IN THE CHEST.           This report was finalized on 5/26/2024 7:58 AM by Maite Hanna MD.                Thank you very much for asking us to be involved in this patient's care.  We will follow along with you.    Pocnho Gaytan PA-C   05/27/24  12:54 EDT    Electronically signed by Poncho Gaytan PA-C, 05/27/24, 12:54 PM EDT.     Patient seen and examined.  Chart reviewed.  Agree with patient's charting and assessment plan by SHALINI  Patient still having chest pressure and dyspnea on exertion.  CTA coronaries was negative for any acute lesion.  On exam  Alert awake and oriented no distress, sensorimotor system are intact  Mucous membranes are moist  Chest no wheezing or crackles  Regular rate rhythm, normal S1-S2, no rub gallop murmur  Abdomen nontender nondistended  No edema  Assessment and plan  #1 cardiac.  Patient with history of coronary artery disease with PCI to the LAD in the past.  Negative CTA for acute lesion.  Patient due to worsening symptoms wants to get invasive evaluation done.  Will schedule for catheter tomorrow.  Continue antianginal medication and current therapy for now.  Lipid-lowering medication to continue  .Electronically signed by Lenard Mcmullen MD, 05/27/24, 3:21 PM EDT.

## 2024-05-28 VITALS
TEMPERATURE: 98.2 F | HEART RATE: 69 BPM | RESPIRATION RATE: 18 BRPM | BODY MASS INDEX: 28.03 KG/M2 | SYSTOLIC BLOOD PRESSURE: 109 MMHG | OXYGEN SATURATION: 98 % | DIASTOLIC BLOOD PRESSURE: 67 MMHG | HEIGHT: 63 IN | WEIGHT: 158.2 LBS

## 2024-05-28 LAB
ALBUMIN SERPL-MCNC: 4 G/DL (ref 3.5–5.2)
ALBUMIN/GLOB SERPL: 1.3 G/DL
ALP SERPL-CCNC: 87 U/L (ref 39–117)
ALT SERPL W P-5'-P-CCNC: 11 U/L (ref 1–33)
ANION GAP SERPL CALCULATED.3IONS-SCNC: 9.5 MMOL/L (ref 5–15)
AST SERPL-CCNC: 20 U/L (ref 1–32)
BILIRUB SERPL-MCNC: 0.3 MG/DL (ref 0–1.2)
BUN SERPL-MCNC: 12 MG/DL (ref 6–20)
BUN/CREAT SERPL: 16.7 (ref 7–25)
CALCIUM SPEC-SCNC: 9.3 MG/DL (ref 8.6–10.5)
CHLORIDE SERPL-SCNC: 105 MMOL/L (ref 98–107)
CO2 SERPL-SCNC: 22.5 MMOL/L (ref 22–29)
CREAT SERPL-MCNC: 0.72 MG/DL (ref 0.57–1)
DEPRECATED RDW RBC AUTO: 38.1 FL (ref 37–54)
EGFRCR SERPLBLD CKD-EPI 2021: 103.9 ML/MIN/1.73
ERYTHROCYTE [DISTWIDTH] IN BLOOD BY AUTOMATED COUNT: 11.9 % (ref 12.3–15.4)
GLOBULIN UR ELPH-MCNC: 3 GM/DL
GLUCOSE SERPL-MCNC: 110 MG/DL (ref 65–99)
HCT VFR BLD AUTO: 39.1 % (ref 34–46.6)
HGB BLD-MCNC: 13.4 G/DL (ref 12–15.9)
MCH RBC QN AUTO: 29.7 PG (ref 26.6–33)
MCHC RBC AUTO-ENTMCNC: 34.3 G/DL (ref 31.5–35.7)
MCV RBC AUTO: 86.7 FL (ref 79–97)
PLATELET # BLD AUTO: 186 10*3/MM3 (ref 140–450)
PMV BLD AUTO: 10 FL (ref 6–12)
POTASSIUM SERPL-SCNC: 4 MMOL/L (ref 3.5–5.2)
PROT SERPL-MCNC: 7 G/DL (ref 6–8.5)
RBC # BLD AUTO: 4.51 10*6/MM3 (ref 3.77–5.28)
SODIUM SERPL-SCNC: 137 MMOL/L (ref 136–145)
WBC NRBC COR # BLD AUTO: 6.15 10*3/MM3 (ref 3.4–10.8)

## 2024-05-28 PROCEDURE — 94664 DEMO&/EVAL PT USE INHALER: CPT

## 2024-05-28 PROCEDURE — 25010000002 HEPARIN (PORCINE) PER 1000 UNITS: Performed by: INTERNAL MEDICINE

## 2024-05-28 PROCEDURE — 25810000003 SODIUM CHLORIDE 0.9 % SOLUTION: Performed by: INTERNAL MEDICINE

## 2024-05-28 PROCEDURE — 25010000002 MIDAZOLAM PER 1 MG: Performed by: INTERNAL MEDICINE

## 2024-05-28 PROCEDURE — 25010000002 FENTANYL CITRATE (PF) 50 MCG/ML SOLUTION: Performed by: INTERNAL MEDICINE

## 2024-05-28 PROCEDURE — G0378 HOSPITAL OBSERVATION PER HR: HCPCS

## 2024-05-28 PROCEDURE — 96372 THER/PROPH/DIAG INJ SC/IM: CPT

## 2024-05-28 PROCEDURE — C1894 INTRO/SHEATH, NON-LASER: HCPCS | Performed by: INTERNAL MEDICINE

## 2024-05-28 PROCEDURE — C1769 GUIDE WIRE: HCPCS | Performed by: INTERNAL MEDICINE

## 2024-05-28 PROCEDURE — 85027 COMPLETE CBC AUTOMATED: CPT | Performed by: INTERNAL MEDICINE

## 2024-05-28 PROCEDURE — 80053 COMPREHEN METABOLIC PANEL: CPT | Performed by: INTERNAL MEDICINE

## 2024-05-28 PROCEDURE — 94799 UNLISTED PULMONARY SVC/PX: CPT

## 2024-05-28 PROCEDURE — 93458 L HRT ARTERY/VENTRICLE ANGIO: CPT | Performed by: INTERNAL MEDICINE

## 2024-05-28 PROCEDURE — 25510000001 IOPAMIDOL PER 1 ML: Performed by: INTERNAL MEDICINE

## 2024-05-28 PROCEDURE — C1760 CLOSURE DEV, VASC: HCPCS

## 2024-05-28 RX ORDER — SODIUM CHLORIDE 9 MG/ML
INJECTION, SOLUTION INTRAVENOUS
Status: COMPLETED | OUTPATIENT
Start: 2024-05-28 | End: 2024-05-28

## 2024-05-28 RX ORDER — NITROGLYCERIN 0.4 MG/1
0.4 TABLET SUBLINGUAL
Status: DISCONTINUED | OUTPATIENT
Start: 2024-05-28 | End: 2024-05-28 | Stop reason: HOSPADM

## 2024-05-28 RX ORDER — LIDOCAINE HYDROCHLORIDE 20 MG/ML
INJECTION, SOLUTION INFILTRATION; PERINEURAL
Status: DISCONTINUED | OUTPATIENT
Start: 2024-05-28 | End: 2024-05-28 | Stop reason: HOSPADM

## 2024-05-28 RX ORDER — ACETAMINOPHEN 325 MG/1
650 TABLET ORAL EVERY 4 HOURS PRN
Status: DISCONTINUED | OUTPATIENT
Start: 2024-05-28 | End: 2024-05-28 | Stop reason: HOSPADM

## 2024-05-28 RX ORDER — PANTOPRAZOLE SODIUM 40 MG/1
40 TABLET, DELAYED RELEASE ORAL
Qty: 30 TABLET | Refills: 0 | Status: SHIPPED | OUTPATIENT
Start: 2024-05-29 | End: 2024-06-28

## 2024-05-28 RX ORDER — FENTANYL CITRATE 50 UG/ML
INJECTION, SOLUTION INTRAMUSCULAR; INTRAVENOUS
Status: DISCONTINUED | OUTPATIENT
Start: 2024-05-28 | End: 2024-05-28 | Stop reason: HOSPADM

## 2024-05-28 RX ORDER — MIDAZOLAM HYDROCHLORIDE 1 MG/ML
INJECTION INTRAMUSCULAR; INTRAVENOUS
Status: DISCONTINUED | OUTPATIENT
Start: 2024-05-28 | End: 2024-05-28 | Stop reason: HOSPADM

## 2024-05-28 RX ORDER — VERAPAMIL HYDROCHLORIDE 2.5 MG/ML
INJECTION, SOLUTION INTRAVENOUS
Status: DISCONTINUED | OUTPATIENT
Start: 2024-05-28 | End: 2024-05-28 | Stop reason: HOSPADM

## 2024-05-28 RX ADMIN — ATORVASTATIN CALCIUM 40 MG: 40 TABLET, FILM COATED ORAL at 08:18

## 2024-05-28 RX ADMIN — ASPIRIN 81 MG: 81 TABLET, COATED ORAL at 08:18

## 2024-05-28 RX ADMIN — PRASUGREL 10 MG: 10 TABLET, FILM COATED ORAL at 08:18

## 2024-05-28 RX ADMIN — HEPARIN SODIUM 5000 UNITS: 5000 INJECTION INTRAVENOUS; SUBCUTANEOUS at 05:04

## 2024-05-28 RX ADMIN — HEPARIN SODIUM 5000 UNITS: 5000 INJECTION INTRAVENOUS; SUBCUTANEOUS at 14:24

## 2024-05-28 RX ADMIN — PANTOPRAZOLE SODIUM 40 MG: 40 TABLET, DELAYED RELEASE ORAL at 05:04

## 2024-05-28 RX ADMIN — FERROUS SULFATE TAB 325 MG (65 MG ELEMENTAL FE) 325 MG: 325 (65 FE) TAB at 08:18

## 2024-05-28 RX ADMIN — ALBUTEROL SULFATE 2.5 MG: 2.5 SOLUTION RESPIRATORY (INHALATION) at 06:41

## 2024-05-28 RX ADMIN — METOPROLOL TARTRATE 25 MG: 25 TABLET, FILM COATED ORAL at 08:18

## 2024-05-28 RX ADMIN — RANOLAZINE 1000 MG: 500 TABLET, FILM COATED, EXTENDED RELEASE ORAL at 08:17

## 2024-05-28 RX ADMIN — Medication 10 ML: at 08:18

## 2024-05-28 NOTE — PLAN OF CARE
Goal Outcome Evaluation:      Patient had non eventful day. Heart cath complete this shift no complications. Patient ambulated after bed rest complete. Patient plans to be discharged this shift.                                         
Goal Outcome Evaluation:      Pt has been resting this shift. Pt remains NPO for a heart cath this am. No signs and symptoms of acute distress noted. No complaints of chest pain or SOB reported.                                          
Goal Outcome Evaluation:   Pt resting in bed this shift. No complaints voiced at this time. No visible acute s/s of distress noted. Plan of care ongoing.                                           
Goal Outcome Evaluation:   Received pt from ER. No signs or symptoms of distress noted. No complaints at this time. Plan of care on going.                                            
Goal Outcome Evaluation:  Patient has been pleasant. Compliant with care and medications as ordered. No S&S of distress noted at this time. Patient denies any chest pain/symptoms at this time. Plan of care ongoing.                                            
fair balance

## 2024-05-28 NOTE — DISCHARGE INSTRUCTIONS
Please take medications as prescribed. Please go to follow-up appointments as recommended. Please seek medical attention if you have worsening of symptoms.

## 2024-05-28 NOTE — SIGNIFICANT NOTE
Patient assessed.  Right radial cath site still with TR band in place, but no air and reservoir.  No evidence of hematoma or ecchymosis.  Right femoral cath access dressing clean, dry, and intact.  Site is soft to touch and nontender.  No evidence of hematoma or ecchymosis here either.  Post-cath activity precautions discussed and patient verbalized understanding.    Stable for discharge from cardiology standpoint.    Electronically signed by SHYLA Forbes, 05/28/24, 4:11 PM EDT.

## 2024-05-28 NOTE — DISCHARGE SUMMARY
[facility]PITAL[facility] DISCHARGE SUMMARY    Patient Identification:  Name:  Abi Jesus  Age:  48 y.o.  Sex:  female  :  1975  MRN:  [id number]  Visit Number:  [id number]    Date of Admission: 2024  Date of Discharge:  2024    PCP: Nino Talbot MD    DISCHARGE DIAGNOSIS    #Atypical chest pain   #Hx of CAD s/p PCI to LAD   #HLD    CONSULTS   Cardiology     PROCEDURES PERFORMED  Echo     Left ventricular systolic function is normal. Calculated left ventricular EF = 60.3% Left ventricular ejection fraction appears to be 61 - 65%.    Left ventricular diastolic function is consistent with (grade I) impaired relaxation.    CTA coronary :  IMPRESSION:  LAD stent noted. The stent is somewhat limited in evaluation but no  definite occlusion is visualized. The mid proximal LAD shows focal  calcification probably contributing to about 25% stenosis.       C :  ASSESSMENT AND PLAN:  1.  Patent proximal LAD stent.  20% mid LAD lesion however there is no hemodynamically significant lesion.  2.  Luminal irregularities in LCx (nondominant vessel)  3.  RCA free of disease (dominant)  4.  LVEF 55-60%, no gross regional wall motion abnormalities.  LVEDP 16 mmHg.  5.  Continue cardiac risk factor modification.       HOSPITAL COURSE  Patient is a 48 y.o. female presented on  to [facility] complaining of chest pain.  Please see the admitting history and physical for further details.      Ms. Jesus is our 49 yo F with hx of PCI to LAD, HLD who presents with chest pain. Chest pain nonradiating, intermittent. It was nonexertional and different from prior discomfort for known CAD. Trops negative X2. CTA with no obvious obstructive disease. D-dimer negative on admission. Symptoms possibly GERD? Cardiology consulted and offered LHC given continued symptoms. Patient opting for LHC. Performed today with no obstructive disease. Continue ASA statin. Observed post cath for both  cath sites (Arm initially tried but unable to access, R femoral access obtained). Observation uneventful and cleared for discharge by cardiology.       Patient to f/u with PCP and primary cardiologist.     VITAL SIGNS:  Temp:  [98 °F (36.7 °C)-98.2 °F (36.8 °C)] 98.2 °F (36.8 °C)  Heart Rate:  [60-87] 64  Resp:  [16-20] 20  BP: (110-157)/(65-92) 124/79  SpO2:  [94 %-99 %] 97 %  on  Flow (L/min):  [2] 2;   Device (Oxygen Therapy): nasal cannula with ETCO2    Body mass index is 28.02 kg/m².  Wt Readings from Last 3 Encounters:   05/28/24 71.8 kg (158 lb 3.2 oz)   07/29/15 69.4 kg (153 lb)       PHYSICAL EXAM:  Constitutional:  Well-developed and well-nourished.  No respiratory distress.      HENT:  Head:  Normocephalic and atraumatic.  Mouth:  Moist mucous membranes.    Eyes:  Conjunctivae and EOM are normal.  Pupils are equal, round, and reactive to light.  No scleral icterus.    Cardiovascular:  Normal rate, regular rhythm and normal heart sounds with no murmur.  Pulmonary/Chest:  No respiratory distress, no wheezes, no crackles, with normal breath sounds and good air movement.  Abdominal:  Soft.  Bowel sounds are normal.  No distension and no tenderness.   Musculoskeletal:  No edema, no tenderness, and no deformity.  No red or swollen joints anywhere.    Neurological:  Alert and oriented to person, place, and time.  No gross neurological deficit.   Skin:  Skin is warm and dry. No rash noted. No pallor.   Peripheral vascular:  Strong pulses in all 4 extremities with no clubbing, no cyanosis, no edema.    DISCHARGE DISPOSITION   Stable    DISCHARGE MEDICATIONS:     Discharge Medications        New Medications        Instructions Start Date   pantoprazole 40 MG EC tablet  Commonly known as: PROTONIX   40 mg, Oral, Every Early Morning   Start Date: May 29, 2024            Continue These Medications        Instructions Start Date   albuterol sulfate  (90 Base) MCG/ACT inhaler  Commonly known as: PROVENTIL  HFA;VENTOLIN HFA;PROAIR HFA   2 puffs, Inhalation, Every 6 Hours PRN      aspirin 81 MG EC tablet   81 mg, Oral, Daily      atorvastatin 40 MG tablet  Commonly known as: LIPITOR   40 mg, Oral, Daily      ferrous sulfate 325 (65 FE) MG tablet   325 mg, Oral, Daily With Breakfast      fluticasone 50 MCG/ACT nasal spray  Commonly known as: FLONASE   2 sprays, Nasal, Daily PRN      metoprolol tartrate 25 MG tablet  Commonly known as: LOPRESSOR   25 mg, Oral, 2 Times Daily      prasugrel 10 MG tablet  Commonly known as: EFFIENT   10 mg, Oral, Daily      ranolazine 1000 MG 12 hr tablet  Commonly known as: RANEXA   1,000 mg, Oral, Daily             Stop These Medications      amoxicillin-clavulanate 875-125 MG per tablet  Commonly known as: AUGMENTIN     omeprazole 20 MG capsule  Commonly known as: priLOSEC                 Follow-up Information       Nino Talbot MD Follow up in 1 week(s).    Specialty: Family Medicine  Contact information:  [address] [facility] [address] [facility]  [address]  [facility] [place] [address]  [phone]               Surinder Kern MD Follow up in 1 month(s).    Specialties: Cardiology, Internal Medicine  Contact information:  [address] [facility] [address]  [address]  [facility] [place] [address]  [phone]                              TEST  RESULTS PENDING AT DISCHARGE       CODE STATUS  Code Status and Medical Interventions:   Ordered at: 05/26/24 1048     Code Status (Patient has no pulse and is not breathing):    CPR (Attempt to Resuscitate)     Medical Interventions (Patient has pulse or is breathing):    Full Support       Michael Alcaraz MD  [facility]  05/28/24  16:18 EDT    Please note that this discharge summary required more than 30 minutes to complete.

## 2024-05-28 NOTE — NURSING NOTE
Cardiac Rehab referral received on patient. After reviewing patient information there is no qualifying diagnosis noted at this time .Qualifications for Cardiac Rehab include Coronary Stenting, AMI (NSTEMI Type 1, STEMI), Stable Angina, CABG, Heart Valve Repair/Replacement, Heart Transplant or Stable Chronic Heart Failure (with these specific qualifications, Left Ventricular Ejection Fraction of 35% or less, NYHA class II to IV symptoms despite optimal heart failure therapy for at least 6 weeks and has not had a recent (less than or equal to 6 weeks) or planned (less than or equal to 6 months) major cardiovascular hospitalizations or procedures. Due to patient being in the hospital, does not meet criteria at this time) Please contact us at [phone] with questions.    If the diagnosis is CHF when the patient meets the CHF criteria for Cardiac Rehab with a new order we will gladly schedule them.

## 2024-06-04 ENCOUNTER — TELEPHONE (OUTPATIENT)
Dept: CARDIOLOGY | Facility: CLINIC | Age: 49
End: 2024-06-04
Payer: COMMERCIAL

## 2024-06-04 NOTE — TELEPHONE ENCOUNTER
----- Message from Lenard Mcmullen sent at 6/3/2024  5:08 PM EDT -----  Please call the patient regarding her normal result.

## 2024-06-24 ENCOUNTER — TRANSCRIBE ORDERS (OUTPATIENT)
Dept: ADMINISTRATIVE | Facility: HOSPITAL | Age: 49
End: 2024-06-24
Payer: COMMERCIAL

## 2024-06-24 DIAGNOSIS — J45.40 MODERATE PERSISTENT ASTHMA, UNCOMPLICATED: Primary | ICD-10-CM

## 2024-07-25 ENCOUNTER — HOSPITAL ENCOUNTER (OUTPATIENT)
Dept: RESPIRATORY THERAPY | Facility: HOSPITAL | Age: 49
Discharge: HOME | End: 2024-07-25
Admitting: INTERNAL MEDICINE
Payer: COMMERCIAL

## 2024-07-25 DIAGNOSIS — J45.40 MODERATE PERSISTENT ASTHMA, UNCOMPLICATED: ICD-10-CM

## 2024-07-25 PROCEDURE — 94726 PLETHYSMOGRAPHY LUNG VOLUMES: CPT

## 2024-07-25 PROCEDURE — 94060 EVALUATION OF WHEEZING: CPT

## 2024-07-25 PROCEDURE — 94729 DIFFUSING CAPACITY: CPT

## 2024-07-25 PROCEDURE — 94640 AIRWAY INHALATION TREATMENT: CPT

## 2024-07-25 RX ORDER — ALBUTEROL SULFATE 2.5 MG/3ML
2.5 SOLUTION RESPIRATORY (INHALATION) ONCE
Status: COMPLETED | OUTPATIENT
Start: 2024-07-25 | End: 2024-07-25

## 2024-07-25 RX ADMIN — ALBUTEROL SULFATE 2.5 MG: 2.5 SOLUTION RESPIRATORY (INHALATION) at 11:20

## 2024-08-16 ENCOUNTER — TRANSCRIBE ORDERS (OUTPATIENT)
Dept: ADMINISTRATIVE | Facility: HOSPITAL | Age: 49
End: 2024-08-16
Payer: COMMERCIAL

## 2024-08-16 DIAGNOSIS — J84.9 INTERSTITIAL PULMONARY DISEASE: Primary | ICD-10-CM

## 2024-08-28 ENCOUNTER — HOSPITAL ENCOUNTER (OUTPATIENT)
Facility: HOSPITAL | Age: 49
Discharge: HOME | End: 2024-08-28
Admitting: INTERNAL MEDICINE
Payer: COMMERCIAL

## 2024-08-28 DIAGNOSIS — J84.9 INTERSTITIAL PULMONARY DISEASE: ICD-10-CM

## 2024-08-28 PROCEDURE — 71250 CT THORAX DX C-: CPT

## (undated) DEVICE — DRSNG SURESITE WNDW 4X4.5

## (undated) DEVICE — MODEL AT P65, P/N 701554-001KIT CONTENTS: HAND CONTROLLER, 3-WAY HIGH-PRESSURE STOPCOCK WITH ROTATING END AND PREMIUM HIGH-PRESSURE TUBING: Brand: ANGIOTOUCH® KIT

## (undated) DEVICE — ADULT DISPOSABLE SINGLE-PATIENT USE PULSE OXIMETER SENSOR: Brand: NONIN

## (undated) DEVICE — LN FLTR ORL/NASL MICROSTREAM NONINTUB A/LNG

## (undated) DEVICE — CATH F5 INF JL 3.5 100CM: Brand: INFINITI

## (undated) DEVICE — DRAPE, RADIAL, STERILE: Brand: MEDLINE

## (undated) DEVICE — ASMBL SPK CONTRST CONTRL

## (undated) DEVICE — PK CATH CARD 70

## (undated) DEVICE — CATH F5 INF PIG145 110CM 6SH: Brand: INFINITI

## (undated) DEVICE — PAD, DEFIB, ADULT, RADIOTRANS, ZOLL: Brand: MEDLINE

## (undated) DEVICE — GLIDESHEATH SLENDER STAINLESS STEEL KIT: Brand: GLIDESHEATH SLENDER

## (undated) DEVICE — GW INQW FIX/CORE PTFE J/3MM .035 260CM

## (undated) DEVICE — A2000 MULTI-USE SYRINGE KIT, P/N 701277-003KIT CONTENTS: 100ML CONTRAST RESERVOIR AND TUBING WITH CONTRAST SPIKE AND CLAMP: Brand: A2000 MULTI-USE SYRINGE KIT

## (undated) DEVICE — ST INF PRI SMRTSTE 20DRP 2VLV 24ML 117

## (undated) DEVICE — ST EXT IV SMRTSTE 2VLV FIX M LL 6ML 41

## (undated) DEVICE — KT MINI ACC MAK COAXL 5F 10CM

## (undated) DEVICE — RADIAL RUNWAY TOP PADS: Brand: RADIAL RUNWAY TOP PADS

## (undated) DEVICE — PINNACLE INTRODUCER SHEATH: Brand: PINNACLE

## (undated) DEVICE — SHEATH INTRO SUPERSHEATH JWIRE .035 5F 11CM

## (undated) DEVICE — CATH F5 INF JR 4 100CM: Brand: INFINITI